# Patient Record
Sex: FEMALE | Race: WHITE | NOT HISPANIC OR LATINO | Employment: OTHER | URBAN - METROPOLITAN AREA
[De-identification: names, ages, dates, MRNs, and addresses within clinical notes are randomized per-mention and may not be internally consistent; named-entity substitution may affect disease eponyms.]

---

## 2019-11-07 PROBLEM — Z12.11 SCREEN FOR COLON CANCER: Status: ACTIVE | Noted: 2019-11-07

## 2020-10-13 PROBLEM — M85.89 OSTEOPENIA OF MULTIPLE SITES: Status: ACTIVE | Noted: 2020-10-13

## 2023-06-20 ENCOUNTER — OFFICE VISIT (OUTPATIENT)
Dept: PAIN MEDICINE | Facility: CLINIC | Age: 68
End: 2023-06-20
Payer: MEDICARE

## 2023-06-20 VITALS
HEIGHT: 62 IN | OXYGEN SATURATION: 97 % | WEIGHT: 147.19 LBS | HEART RATE: 68 BPM | BODY MASS INDEX: 27.08 KG/M2 | SYSTOLIC BLOOD PRESSURE: 142 MMHG | DIASTOLIC BLOOD PRESSURE: 65 MMHG

## 2023-06-20 DIAGNOSIS — M41.9 SCOLIOSIS OF THORACOLUMBAR SPINE, UNSPECIFIED SCOLIOSIS TYPE: ICD-10-CM

## 2023-06-20 DIAGNOSIS — M47.816 LUMBAR SPONDYLOSIS: ICD-10-CM

## 2023-06-20 DIAGNOSIS — M43.16 SPONDYLOLISTHESIS OF LUMBAR REGION: ICD-10-CM

## 2023-06-20 DIAGNOSIS — M54.16 LUMBAR RADICULOPATHY: Primary | ICD-10-CM

## 2023-06-20 DIAGNOSIS — M51.36 DDD (DEGENERATIVE DISC DISEASE), LUMBAR: ICD-10-CM

## 2023-06-20 PROCEDURE — 1101F PT FALLS ASSESS-DOCD LE1/YR: CPT | Mod: CPTII,S$GLB,, | Performed by: PHYSICIAN ASSISTANT

## 2023-06-20 PROCEDURE — 1160F RVW MEDS BY RX/DR IN RCRD: CPT | Mod: CPTII,S$GLB,, | Performed by: PHYSICIAN ASSISTANT

## 2023-06-20 PROCEDURE — 1125F PR PAIN SEVERITY QUANTIFIED, PAIN PRESENT: ICD-10-PCS | Mod: CPTII,S$GLB,, | Performed by: PHYSICIAN ASSISTANT

## 2023-06-20 PROCEDURE — 3078F DIAST BP <80 MM HG: CPT | Mod: CPTII,S$GLB,, | Performed by: PHYSICIAN ASSISTANT

## 2023-06-20 PROCEDURE — 3008F PR BODY MASS INDEX (BMI) DOCUMENTED: ICD-10-PCS | Mod: CPTII,S$GLB,, | Performed by: PHYSICIAN ASSISTANT

## 2023-06-20 PROCEDURE — 1159F MED LIST DOCD IN RCRD: CPT | Mod: CPTII,S$GLB,, | Performed by: PHYSICIAN ASSISTANT

## 2023-06-20 PROCEDURE — 3077F PR MOST RECENT SYSTOLIC BLOOD PRESSURE >= 140 MM HG: ICD-10-PCS | Mod: CPTII,S$GLB,, | Performed by: PHYSICIAN ASSISTANT

## 2023-06-20 PROCEDURE — 3288F PR FALLS RISK ASSESSMENT DOCUMENTED: ICD-10-PCS | Mod: CPTII,S$GLB,, | Performed by: PHYSICIAN ASSISTANT

## 2023-06-20 PROCEDURE — 1160F PR REVIEW ALL MEDS BY PRESCRIBER/CLIN PHARMACIST DOCUMENTED: ICD-10-PCS | Mod: CPTII,S$GLB,, | Performed by: PHYSICIAN ASSISTANT

## 2023-06-20 PROCEDURE — 99204 PR OFFICE/OUTPT VISIT, NEW, LEVL IV, 45-59 MIN: ICD-10-PCS | Mod: S$GLB,,, | Performed by: PHYSICIAN ASSISTANT

## 2023-06-20 PROCEDURE — 99999 PR PBB SHADOW E&M-EST. PATIENT-LVL IV: ICD-10-PCS | Mod: PBBFAC,,, | Performed by: PHYSICIAN ASSISTANT

## 2023-06-20 PROCEDURE — 99999 PR PBB SHADOW E&M-EST. PATIENT-LVL IV: CPT | Mod: PBBFAC,,, | Performed by: PHYSICIAN ASSISTANT

## 2023-06-20 PROCEDURE — 99204 OFFICE O/P NEW MOD 45 MIN: CPT | Mod: S$GLB,,, | Performed by: PHYSICIAN ASSISTANT

## 2023-06-20 PROCEDURE — 3288F FALL RISK ASSESSMENT DOCD: CPT | Mod: CPTII,S$GLB,, | Performed by: PHYSICIAN ASSISTANT

## 2023-06-20 PROCEDURE — 3077F SYST BP >= 140 MM HG: CPT | Mod: CPTII,S$GLB,, | Performed by: PHYSICIAN ASSISTANT

## 2023-06-20 PROCEDURE — 3078F PR MOST RECENT DIASTOLIC BLOOD PRESSURE < 80 MM HG: ICD-10-PCS | Mod: CPTII,S$GLB,, | Performed by: PHYSICIAN ASSISTANT

## 2023-06-20 PROCEDURE — 1125F AMNT PAIN NOTED PAIN PRSNT: CPT | Mod: CPTII,S$GLB,, | Performed by: PHYSICIAN ASSISTANT

## 2023-06-20 PROCEDURE — 1159F PR MEDICATION LIST DOCUMENTED IN MEDICAL RECORD: ICD-10-PCS | Mod: CPTII,S$GLB,, | Performed by: PHYSICIAN ASSISTANT

## 2023-06-20 PROCEDURE — 1101F PR PT FALLS ASSESS DOC 0-1 FALLS W/OUT INJ PAST YR: ICD-10-PCS | Mod: CPTII,S$GLB,, | Performed by: PHYSICIAN ASSISTANT

## 2023-06-20 PROCEDURE — 3008F BODY MASS INDEX DOCD: CPT | Mod: CPTII,S$GLB,, | Performed by: PHYSICIAN ASSISTANT

## 2023-06-20 RX ORDER — ALPRAZOLAM 0.5 MG/1
0.5 TABLET, ORALLY DISINTEGRATING ORAL ONCE AS NEEDED
Status: CANCELLED | OUTPATIENT
Start: 2023-06-20 | End: 2034-11-16

## 2023-06-20 RX ORDER — GABAPENTIN 300 MG/1
300 CAPSULE ORAL 2 TIMES DAILY
Qty: 60 CAPSULE | Refills: 2 | Status: SHIPPED | OUTPATIENT
Start: 2023-06-20 | End: 2023-08-25

## 2023-06-20 NOTE — H&P (VIEW-ONLY)
[facility] New Patient Evaluation      Referred by: Dr. Adriane Granados    PCP: Dr. Adriane Granados    CC: No chief complaint on file.     No flowsheet data found.      HPI:   Karla Bonilla is a 67 y.o. female patient who has a past medical history of Colon polyp and Personal history of nicotine dependence. She presents with low back and left leg pain.  She reports having a history of right-sided pain but this is currently not bothering her.  She describes pain in the left low back that radiates into the left buttock and posterior thigh down to her knee.  This has been present for the past 3 and half months.  She attributes this to an ankle fracture last year, being sedentary and then starting to walk again.  She describes the pain as throbbing, deep, sharp and shooting.  It is worse with sitting, standing, bending, touching, walking, nighttime, morning, extension, flexing, lifting, getting out of a bed or a chair.  The only relief she has found has been with gabapentin 300 mg nightly.  She saw her primary care physician yesterday and was instructed to increase to twice a day.  She has been doing physician directed home exercises for the past several months without any relief and her pain is currently too severe to go to physical therapy.  She denies having weakness or numbness, denies bladder or bowel incontinence.    Pain Intervention History:      Past Spine Surgical History:      Past and current medications:  Antineuropathics:  Gabapentin 300 mg twice daily  NSAIDs:  Took ibuprofen for several months but developed side effects and discontinued  Physical therapy:  Physician directed home exercise program for about 3 months.  Antidepressants:  Muscle relaxers:  Opioids:  Antiplatelets/Anticoagulants:    History:    Current Outpatient Medications:     ALPRAZolam (XANAX) 0.25 MG tablet, Take 1 tablet (0.25 mg total) by mouth 2 (two) times daily as needed for Anxiety., Disp: 30 tablet,  Rfl: 0    gabapentin (NEURONTIN) 300 MG capsule, Take 1 capsule (300 mg total) by mouth 2 (two) times daily., Disp: 60 capsule, Rfl: 2    Past Medical History:   Diagnosis Date    Colon polyp     Personal history of nicotine dependence        Past Surgical History:   Procedure Laterality Date    CATARACT EXTRACTION, BILATERAL      COLONOSCOPY N/A 2019    Procedure: COLONOSCOPY;  Surgeon: Pan Duran MD;  Location: [facility];  Service: Endoscopy;  Laterality: N/A;    TUBAL LIGATION         Family History   Problem Relation Age of Onset    Breast cancer Mother 70    Heart disease Father     Diabetes Brother        Social History     Socioeconomic History    Marital status:    Tobacco Use    Smoking status: Former     Packs/day: 1.00     Years: 44.00     Pack years: 44.00     Types: Cigarettes     Quit date: 2020     Years since quittin.9    Smokeless tobacco: Never   Substance and Sexual Activity    Alcohol use: Yes     Comment: occ glass of wine (1 a month)      Social Determinants of Health     Financial Resource Strain: Low Risk     Difficulty of Paying Living Expenses: Not hard at all   Food Insecurity: No Food Insecurity    Worried About Running Out of Food in the Last Year: Never true    Ran Out of Food in the Last Year: Never true   Transportation Needs: No Transportation Needs    Lack of Transportation (Medical): No    Lack of Transportation (Non-Medical): No   Physical Activity: Insufficiently Active    Days of Exercise per Week: 3 days    Minutes of Exercise per Session: 20 min   Stress: No Stress Concern Present    Feeling of Stress : Not at all   Social Connections: Unknown    Frequency of Communication with Friends and Family: More than three times a week    Frequency of Social Gatherings with Friends and Family: More than three times a week    Active Member of Clubs or Organizations: Yes    Attends Club or Organization Meetings: More than 4 times per year    Marital  "Status:    Housing Stability: Low Risk     Unable to Pay for Housing in the Last Year: No    Number of Places Lived in the Last Year: 1    Unstable Housing in the Last Year: No       Review of patient's allergies indicates:  No Known Allergies    Review of Systems:  The patient reports stomach pain, back pain, anxiety and loss of balance.  Balance of review of systems is negative.    Physical Exam:  Vitals:    06/20/23 0928   BP: (!) 142/65   Pulse: 68   SpO2: 97%   Weight: 66.7 kg (147 lb 2.5 oz)   Height: 5' 2" (1.575 m)   PainSc:   5   PainLoc: Abdomen     Body mass index is 26.92 kg/m².    Gen: NAD  Psych: mood appropriate for given condition  HEENT: eyes anicteric   CV: RRR  HEENT: anicteric   Respiratory: non-labored, no signs of respiratory distress  Abd: non-distended  Skin: warm, dry and intact.  Gait:  Cautious antalgic gait due to left leg pain.    Lumbar spine:  Lumbar spine: ROM is mildly reduced with flexion without increased pain.  Range of motion is severely reduced with extension with increased pain upon standing upright.  Severe pain with oblique extension to either side.  David's test is deferred due to pain.  Straight leg raise left leg pain.  Internal and external rotation of the hip is deferred due to pain.  Myofascial exam:  Exquisite tenderness to palpation to the left lower lumbar paraspinous muscles.  No tenderness to palpation over the bilateral greater trochanters and bilateral SI joint    Sensory:  Intact and symmetrical to light touch in L1-S1 dermatomes bilaterally.    Motor:     Right Left   L2/3 Iliacus Hip flexion  5  5   L3/4 Qudratus Femoris Knee Extension  5  5   L4/5 Tib Anterior Ankle Dorsiflexion   5  5   L5/S1 Extensor Hallicus Longus Great toe extension  5  5   S1/S2 Gastroc/Soleus Plantar Flexion  5  5      Right Left                  Patellar DTR 2+ 2+   Achilles DTR 0+ 0+                      Labs:  No results found for: LABA1C, HGBA1C    Lab Results   Component " Value Date    WBC 8.52 2022    HGB 13.4 2022    HCT 39.8 2022    MCV 95 2022     2022           Imagin2023 MRI lumbar spine   NOMENCLATURE: Five lumbar type vertebral bodies.     CORD/CAUDA EQUINA: Conus has normal size and signal and ends at a normal level of L1-L2.     ALIGNMENT: Trace retrolisthesis of L1 on L2.  4 mm retrolisthesis of L2 on L3.  Trace retrolisthesis of L3 on L4.  Trace grade 1 anterolisthesis of L4 on L5.     BONES: Vertebral body heights are maintained.  Type 1 endplate changes on the left at L1-2, L2-3 and to lesser extent at L3-4 and L4-5.  Several tiny Schmorl's nodes.  No aggressive bone marrow signal.     PARASPINAL AREA: Tiny possible synovial cyst in the left dorsal paraspinal soft tissues near the tip of the left L5-S1 facet (series 7, image 14).     LUMBAR DISC LEVELS:     T12-L1: No disc herniation or significant posterior osteophytic ridging.  No significant spinal canal or foraminal stenosis.     L1-L2: Trace retrolisthesis.  Mild disc bulge.  Disc height loss.  Mild bilateral facet hypertrophy.  Minimal narrowing of the bilateral lateral recesses.  No significant spinal canal stenosis.  Mild left foraminal stenosis.  No significant spinal canal or foraminal stenosis.     L2-L3: Retrolisthesis.  Uncovering the disc and mild disc bulge.  Mild bilateral facet hypertrophy.  Ligamentum flavum thickening.  Moderate left and mild right narrowing of the lateral recesses.  No significant spinal canal stenosis.  Moderate right and mild left foraminal stenosis.     L3-L4: Trace retrolisthesis.  Mild disc bulge.  Mild-moderate right and mild left facet hypertrophy.  Ligamentum flavum thickening.  Mild narrowing of the bilateral lateral recesses.  No significant spinal canal stenosis.  Mild bilateral foraminal stenosis.     L4-L5: Trace anterolisthesis.  Mild disc bulge.  Moderate right and mild-moderate left facet hypertrophy.  Ligamentum flavum  thickening.  Mild narrowing of the bilateral lateral recesses.  No significant spinal canal stenosis.  Mild bilateral foraminal stenosis.     L5-S1: Minimal disc bulge.  Mild-moderate left and mild right facet hypertrophy.  No significant spinal canal stenosis. Mild right greater than left foraminal stenosis.    Assessment:   Problem List Items Addressed This Visit    None  Visit Diagnoses       Lumbar radiculopathy    -  Primary    Relevant Orders    Case Request Operating Room: Injection-steroid-epidural-lumbar L3/4 (Completed)    Lumbar spondylosis        DDD (degenerative disc disease), lumbar        Spondylolisthesis of lumbar region        Scoliosis of thoracolumbar spine, unspecified scoliosis type                  Karla Bonilla is a 67 y.o. female patient who has a past medical history of Colon polyp and Personal history of nicotine dependence. She presents with low back and left leg pain.  She reports having a history of right-sided pain but this is currently not bothering her.  She describes pain in the left low back that radiates into the left buttock and posterior thigh down to her knee.  This has been present for the past 3 and half months.  She attributes this to an ankle fracture last year, being sedentary and then starting to walk again.  She describes the pain as throbbing, deep, sharp and shooting.  It is worse with sitting, standing, bending, touching, walking, nighttime, morning, extension, flexing, lifting, getting out of a bed or a chair.  The only relief she has found has been with gabapentin 300 mg nightly.  She saw her primary care physician yesterday and was instructed to increase to twice a day.  She has been doing physician directed home exercises for the past several months without any relief and her pain is currently too severe to go to physical therapy.  She denies having weakness or numbness, denies bladder or bowel incontinence.    1. I reviewed the patient's lumbar spine  MRI with her and we discussed that she has multilevel degenerative changes in the form of spondylosis, spondylolisthesis, scoliosis and disc degeneration.  I believe her symptoms are most likely due to L2/3 and L3/4 where she has left lateral recess stenosis.  2. She has tried 3 and half months of conservative treatment in the form of physician directed home exercises and NSAIDs.  She was prescribed gabapentin by primary care with some relief so she is increasing this to twice daily.  However, she continues to have severe pain and is unable to perform her usual activities.  3.  We discussed the role of interventional procedures and I will schedule her for an L3/4 interlaminar epidural steroid injection to the left to cover L2/3 and L3/4.  4. Follow-up in 2-3 weeks postprocedure or sooner as needed.    : Not applicable    This note was completed with dictation software and grammatical errors may exist.

## 2023-06-20 NOTE — PROGRESS NOTES
[facility] New Patient Evaluation      Referred by: Dr. Adriane Granados    PCP: Dr. Adriane Granados    CC: No chief complaint on file.     No flowsheet data found.      HPI:   Karla Bonilla is a 67 y.o. female patient who has a past medical history of Colon polyp and Personal history of nicotine dependence. She presents with low back and left leg pain.  She reports having a history of right-sided pain but this is currently not bothering her.  She describes pain in the left low back that radiates into the left buttock and posterior thigh down to her knee.  This has been present for the past 3 and half months.  She attributes this to an ankle fracture last year, being sedentary and then starting to walk again.  She describes the pain as throbbing, deep, sharp and shooting.  It is worse with sitting, standing, bending, touching, walking, nighttime, morning, extension, flexing, lifting, getting out of a bed or a chair.  The only relief she has found has been with gabapentin 300 mg nightly.  She saw her primary care physician yesterday and was instructed to increase to twice a day.  She has been doing physician directed home exercises for the past several months without any relief and her pain is currently too severe to go to physical therapy.  She denies having weakness or numbness, denies bladder or bowel incontinence.    Pain Intervention History:      Past Spine Surgical History:      Past and current medications:  Antineuropathics:  Gabapentin 300 mg twice daily  NSAIDs:  Took ibuprofen for several months but developed side effects and discontinued  Physical therapy:  Physician directed home exercise program for about 3 months.  Antidepressants:  Muscle relaxers:  Opioids:  Antiplatelets/Anticoagulants:    History:    Current Outpatient Medications:     ALPRAZolam (XANAX) 0.25 MG tablet, Take 1 tablet (0.25 mg total) by mouth 2 (two) times daily as needed for Anxiety., Disp: 30 tablet,  Rfl: 0    gabapentin (NEURONTIN) 300 MG capsule, Take 1 capsule (300 mg total) by mouth 2 (two) times daily., Disp: 60 capsule, Rfl: 2    Past Medical History:   Diagnosis Date    Colon polyp     Personal history of nicotine dependence        Past Surgical History:   Procedure Laterality Date    CATARACT EXTRACTION, BILATERAL      COLONOSCOPY N/A 2019    Procedure: COLONOSCOPY;  Surgeon: Pan Duran MD;  Location: [facility];  Service: Endoscopy;  Laterality: N/A;    TUBAL LIGATION         Family History   Problem Relation Age of Onset    Breast cancer Mother 70    Heart disease Father     Diabetes Brother        Social History     Socioeconomic History    Marital status:    Tobacco Use    Smoking status: Former     Packs/day: 1.00     Years: 44.00     Pack years: 44.00     Types: Cigarettes     Quit date: 2020     Years since quittin.9    Smokeless tobacco: Never   Substance and Sexual Activity    Alcohol use: Yes     Comment: occ glass of wine (1 a month)      Social Determinants of Health     Financial Resource Strain: Low Risk     Difficulty of Paying Living Expenses: Not hard at all   Food Insecurity: No Food Insecurity    Worried About Running Out of Food in the Last Year: Never true    Ran Out of Food in the Last Year: Never true   Transportation Needs: No Transportation Needs    Lack of Transportation (Medical): No    Lack of Transportation (Non-Medical): No   Physical Activity: Insufficiently Active    Days of Exercise per Week: 3 days    Minutes of Exercise per Session: 20 min   Stress: No Stress Concern Present    Feeling of Stress : Not at all   Social Connections: Unknown    Frequency of Communication with Friends and Family: More than three times a week    Frequency of Social Gatherings with Friends and Family: More than three times a week    Active Member of Clubs or Organizations: Yes    Attends Club or Organization Meetings: More than 4 times per year    Marital  "Status:    Housing Stability: Low Risk     Unable to Pay for Housing in the Last Year: No    Number of Places Lived in the Last Year: 1    Unstable Housing in the Last Year: No       Review of patient's allergies indicates:  No Known Allergies    Review of Systems:  The patient reports stomach pain, back pain, anxiety and loss of balance.  Balance of review of systems is negative.    Physical Exam:  Vitals:    06/20/23 0928   BP: (!) 142/65   Pulse: 68   SpO2: 97%   Weight: 66.7 kg (147 lb 2.5 oz)   Height: 5' 2" (1.575 m)   PainSc:   5   PainLoc: Abdomen     Body mass index is 26.92 kg/m².    Gen: NAD  Psych: mood appropriate for given condition  HEENT: eyes anicteric   CV: RRR  HEENT: anicteric   Respiratory: non-labored, no signs of respiratory distress  Abd: non-distended  Skin: warm, dry and intact.  Gait:  Cautious antalgic gait due to left leg pain.    Lumbar spine:  Lumbar spine: ROM is mildly reduced with flexion without increased pain.  Range of motion is severely reduced with extension with increased pain upon standing upright.  Severe pain with oblique extension to either side.  David's test is deferred due to pain.  Straight leg raise left leg pain.  Internal and external rotation of the hip is deferred due to pain.  Myofascial exam:  Exquisite tenderness to palpation to the left lower lumbar paraspinous muscles.  No tenderness to palpation over the bilateral greater trochanters and bilateral SI joint    Sensory:  Intact and symmetrical to light touch in L1-S1 dermatomes bilaterally.    Motor:     Right Left   L2/3 Iliacus Hip flexion  5  5   L3/4 Qudratus Femoris Knee Extension  5  5   L4/5 Tib Anterior Ankle Dorsiflexion   5  5   L5/S1 Extensor Hallicus Longus Great toe extension  5  5   S1/S2 Gastroc/Soleus Plantar Flexion  5  5      Right Left                  Patellar DTR 2+ 2+   Achilles DTR 0+ 0+                      Labs:  No results found for: LABA1C, HGBA1C    Lab Results   Component " Value Date    WBC 8.52 2022    HGB 13.4 2022    HCT 39.8 2022    MCV 95 2022     2022           Imagin2023 MRI lumbar spine   NOMENCLATURE: Five lumbar type vertebral bodies.     CORD/CAUDA EQUINA: Conus has normal size and signal and ends at a normal level of L1-L2.     ALIGNMENT: Trace retrolisthesis of L1 on L2.  4 mm retrolisthesis of L2 on L3.  Trace retrolisthesis of L3 on L4.  Trace grade 1 anterolisthesis of L4 on L5.     BONES: Vertebral body heights are maintained.  Type 1 endplate changes on the left at L1-2, L2-3 and to lesser extent at L3-4 and L4-5.  Several tiny Schmorl's nodes.  No aggressive bone marrow signal.     PARASPINAL AREA: Tiny possible synovial cyst in the left dorsal paraspinal soft tissues near the tip of the left L5-S1 facet (series 7, image 14).     LUMBAR DISC LEVELS:     T12-L1: No disc herniation or significant posterior osteophytic ridging.  No significant spinal canal or foraminal stenosis.     L1-L2: Trace retrolisthesis.  Mild disc bulge.  Disc height loss.  Mild bilateral facet hypertrophy.  Minimal narrowing of the bilateral lateral recesses.  No significant spinal canal stenosis.  Mild left foraminal stenosis.  No significant spinal canal or foraminal stenosis.     L2-L3: Retrolisthesis.  Uncovering the disc and mild disc bulge.  Mild bilateral facet hypertrophy.  Ligamentum flavum thickening.  Moderate left and mild right narrowing of the lateral recesses.  No significant spinal canal stenosis.  Moderate right and mild left foraminal stenosis.     L3-L4: Trace retrolisthesis.  Mild disc bulge.  Mild-moderate right and mild left facet hypertrophy.  Ligamentum flavum thickening.  Mild narrowing of the bilateral lateral recesses.  No significant spinal canal stenosis.  Mild bilateral foraminal stenosis.     L4-L5: Trace anterolisthesis.  Mild disc bulge.  Moderate right and mild-moderate left facet hypertrophy.  Ligamentum flavum  thickening.  Mild narrowing of the bilateral lateral recesses.  No significant spinal canal stenosis.  Mild bilateral foraminal stenosis.     L5-S1: Minimal disc bulge.  Mild-moderate left and mild right facet hypertrophy.  No significant spinal canal stenosis. Mild right greater than left foraminal stenosis.    Assessment:   Problem List Items Addressed This Visit    None  Visit Diagnoses       Lumbar radiculopathy    -  Primary    Relevant Orders    Case Request Operating Room: Injection-steroid-epidural-lumbar L3/4 (Completed)    Lumbar spondylosis        DDD (degenerative disc disease), lumbar        Spondylolisthesis of lumbar region        Scoliosis of thoracolumbar spine, unspecified scoliosis type                  Karla Bonilla is a 67 y.o. female patient who has a past medical history of Colon polyp and Personal history of nicotine dependence. She presents with low back and left leg pain.  She reports having a history of right-sided pain but this is currently not bothering her.  She describes pain in the left low back that radiates into the left buttock and posterior thigh down to her knee.  This has been present for the past 3 and half months.  She attributes this to an ankle fracture last year, being sedentary and then starting to walk again.  She describes the pain as throbbing, deep, sharp and shooting.  It is worse with sitting, standing, bending, touching, walking, nighttime, morning, extension, flexing, lifting, getting out of a bed or a chair.  The only relief she has found has been with gabapentin 300 mg nightly.  She saw her primary care physician yesterday and was instructed to increase to twice a day.  She has been doing physician directed home exercises for the past several months without any relief and her pain is currently too severe to go to physical therapy.  She denies having weakness or numbness, denies bladder or bowel incontinence.    1. I reviewed the patient's lumbar spine  MRI with her and we discussed that she has multilevel degenerative changes in the form of spondylosis, spondylolisthesis, scoliosis and disc degeneration.  I believe her symptoms are most likely due to L2/3 and L3/4 where she has left lateral recess stenosis.  2. She has tried 3 and half months of conservative treatment in the form of physician directed home exercises and NSAIDs.  She was prescribed gabapentin by primary care with some relief so she is increasing this to twice daily.  However, she continues to have severe pain and is unable to perform her usual activities.  3.  We discussed the role of interventional procedures and I will schedule her for an L3/4 interlaminar epidural steroid injection to the left to cover L2/3 and L3/4.  4. Follow-up in 2-3 weeks postprocedure or sooner as needed.    : Not applicable    This note was completed with dictation software and grammatical errors may exist.

## 2023-06-21 ENCOUNTER — PATIENT MESSAGE (OUTPATIENT)
Dept: PAIN MEDICINE | Facility: CLINIC | Age: 68
End: 2023-06-21
Payer: MEDICARE

## 2023-07-07 ENCOUNTER — HOSPITAL ENCOUNTER (OUTPATIENT)
Dept: RADIOLOGY | Facility: HOSPITAL | Age: 68
Discharge: HOME | End: 2023-07-07
Attending: ANESTHESIOLOGY | Admitting: ANESTHESIOLOGY
Payer: MEDICARE

## 2023-07-07 ENCOUNTER — HOSPITAL ENCOUNTER (OUTPATIENT)
Facility: HOSPITAL | Age: 68
Discharge: HOME | End: 2023-07-07
Attending: ANESTHESIOLOGY | Admitting: ANESTHESIOLOGY
Payer: MEDICARE

## 2023-07-07 VITALS
TEMPERATURE: 98 F | RESPIRATION RATE: 17 BRPM | HEIGHT: 62 IN | WEIGHT: 147 LBS | SYSTOLIC BLOOD PRESSURE: 113 MMHG | HEART RATE: 52 BPM | DIASTOLIC BLOOD PRESSURE: 77 MMHG | OXYGEN SATURATION: 98 % | BODY MASS INDEX: 27.05 KG/M2

## 2023-07-07 DIAGNOSIS — G89.29 CHRONIC MIDLINE LOW BACK PAIN WITH LEFT-SIDED SCIATICA: ICD-10-CM

## 2023-07-07 DIAGNOSIS — M54.42 CHRONIC MIDLINE LOW BACK PAIN WITH LEFT-SIDED SCIATICA: ICD-10-CM

## 2023-07-07 DIAGNOSIS — M54.50 LOWER BACK PAIN: ICD-10-CM

## 2023-07-07 DIAGNOSIS — M54.16 LUMBAR RADICULOPATHY: Primary | ICD-10-CM

## 2023-07-07 PROCEDURE — 62323 PR INJ LUMBAR/SACRAL, W/IMAGING GUIDANCE: ICD-10-PCS | Mod: ,,, | Performed by: ANESTHESIOLOGY

## 2023-07-07 PROCEDURE — 63600175 PHARM REV CODE 636 W HCPCS: Mod: PO | Performed by: ANESTHESIOLOGY

## 2023-07-07 PROCEDURE — 25500020 PHARM REV CODE 255: Mod: PO | Performed by: ANESTHESIOLOGY

## 2023-07-07 PROCEDURE — 25000003 PHARM REV CODE 250: Mod: PO | Performed by: PHYSICIAN ASSISTANT

## 2023-07-07 PROCEDURE — 25000003 PHARM REV CODE 250: Mod: PO | Performed by: ANESTHESIOLOGY

## 2023-07-07 PROCEDURE — 62323 NJX INTERLAMINAR LMBR/SAC: CPT | Mod: PO | Performed by: ANESTHESIOLOGY

## 2023-07-07 PROCEDURE — 62323 NJX INTERLAMINAR LMBR/SAC: CPT | Mod: ,,, | Performed by: ANESTHESIOLOGY

## 2023-07-07 PROCEDURE — 76000 FLUOROSCOPY <1 HR PHYS/QHP: CPT | Mod: TC,PO

## 2023-07-07 RX ORDER — METHYLPREDNISOLONE ACETATE 80 MG/ML
INJECTION, SUSPENSION INTRA-ARTICULAR; INTRALESIONAL; INTRAMUSCULAR; SOFT TISSUE
Status: DISCONTINUED | OUTPATIENT
Start: 2023-07-07 | End: 2023-07-07 | Stop reason: HOSPADM

## 2023-07-07 RX ORDER — ALPRAZOLAM 0.5 MG/1
0.5 TABLET, ORALLY DISINTEGRATING ORAL ONCE AS NEEDED
Status: COMPLETED | OUTPATIENT
Start: 2023-07-07 | End: 2023-07-07

## 2023-07-07 RX ORDER — LIDOCAINE HYDROCHLORIDE 10 MG/ML
INJECTION, SOLUTION EPIDURAL; INFILTRATION; INTRACAUDAL; PERINEURAL
Status: DISCONTINUED | OUTPATIENT
Start: 2023-07-07 | End: 2023-07-07 | Stop reason: HOSPADM

## 2023-07-07 RX ORDER — SODIUM CHLORIDE 0.9 G/100ML
IRRIGANT IRRIGATION
Status: DISCONTINUED | OUTPATIENT
Start: 2023-07-07 | End: 2023-07-07 | Stop reason: HOSPADM

## 2023-07-07 RX ADMIN — ALPRAZOLAM 0.5 MG: 0.5 TABLET, ORALLY DISINTEGRATING ORAL at 09:07

## 2023-07-07 NOTE — OP NOTE
"Procedure Note    Procedure Date: 7/7/2023    Procedure Performed:  L3/4 lumbar interlaminar epidural steroid injection under fluoroscopy.    Indications:  Karla Bonilla presents with lumbar radiculitis/radiculopathy secondary to disc herniation, osteophyte/osteophyte complexes, and/or severe degenerative disc disease producing foraminal or central spinal stenosis.  The pain has been present for at least 4 weeks and the patient has failed to respond to noninvasive conservative care.  Pain rated by NRS at baseline prior to intervention is 6/10.  Their radiculitis/radiculopathy and/or neurogenic claudication is severe enough to greatly impact their quality of life or function.     Pre-op diagnosis: Lumbar Radiculopathy    Post-op diagnosis: same    Physician: Brian Traylor MD    IV anxiolysis medications: none    Medications injected: depomedrol 80mg, 1% Lidocaine 1ml, 2 mL sterile, preservative-free normal saline.    Local anesthetic used: 1% Lidocaine, 1 ml    Estimated Blood Loss: none    Complications:  none    Technique:  The patient was interviewed in the holding area and Risks/Benefits were discussed, including, but not limited to, the possibility of new or different pain, bleeding or infection.   All questions were answered.  The patient understood and accepted risks.  Consent was verfied.  A time-out was taken to identify patient and procedure prior to starting the procedure. The patient was placed in the prone position on the fluoroscopy table. The area of the lumbar spine was prepped with Chloraprep x2 and draped in a sterile manner. The L3/4 interspace was identified and marked under AP fluoroscopy. The skin and subcutaneous tissues overlying the targeted interspace were anesthetized with 3-5 mL of 1% lidocaine using a 25G, 1.5" needle.  A 20G, 3.5" Tuohy epidural needle was directed toward the interspace under fluoroscopic guidance until the ligamentum flavum was engaged. From this point, a loss " of resistance technique with a glass syringe and saline was used to identify entrance of the needle into the epidural space. Once loss of resistance was observed 1 mL of contrast solution was injected. An appropriate epidurogram was noted.  A 4 mL mixture consisting of saline, 1 mL 1% Lidocaine and 80 mg of depomedrol was injected slowly and without resistance.  The needle was flushed with normal saline and removed. The contrast was seen to be displaced after injection. Patient was awake/responsive during all injections.  The patient tolerated the procedure well and was transferred to the [facility] in stable condition.  The patient was monitored after the procedure and was given post-procedure and discharge instructions to follow at home. The patient was discharged in a stable condition.

## 2023-07-07 NOTE — DISCHARGE SUMMARY
[facility]  Discharge Note  Short Stay    Admit Date: 7/7/2023    Discharge Date: 7/7/2023    Attending Physician: Brian Traylor     Discharge Provider: Brian Traylor    Diagnoses:  There are no hospital problems to display for this patient.      Discharged Condition: Good    Final Diagnoses: Lumbar radiculopathy [M54.16]    Disposition: Home or Self Care    Hospital Course: No complications, uneventful    Outcome of Hospitalization, Treatment, Procedure, or Surgery:  Patient was admitted for outpatient interventional pain management procedure. The patient tolerated the procedure well with no complications.    Follow up/Patient Instructions:  Follow up as scheduled in Pain Management office in 2-3 weeks.  Patient has received instructions and follow up date and time.    Medications:  Continue previous medications    Discharge Procedure Orders   Notify your health care provider if you experience any of the following:  temperature >100.4     Notify your health care provider if you experience any of the following:  persistent nausea and vomiting or diarrhea     Notify your health care provider if you experience any of the following:  severe uncontrolled pain     Notify your health care provider if you experience any of the following:  redness, tenderness, or signs of infection (pain, swelling, redness, odor or green/yellow discharge around incision site)     Notify your health care provider if you experience any of the following:  difficulty breathing or increased cough     Notify your health care provider if you experience any of the following:  severe persistent headache     Notify your health care provider if you experience any of the following:  worsening rash     Notify your health care provider if you experience any of the following:  persistent dizziness, light-headedness, or visual disturbances     Notify your health care provider if you experience any of the following:  increased confusion or  weakness     Activity as tolerated

## 2023-08-01 ENCOUNTER — OFFICE VISIT (OUTPATIENT)
Dept: PAIN MEDICINE | Facility: CLINIC | Age: 68
End: 2023-08-01
Payer: MEDICARE

## 2023-08-01 VITALS
SYSTOLIC BLOOD PRESSURE: 130 MMHG | WEIGHT: 148.81 LBS | HEIGHT: 62 IN | DIASTOLIC BLOOD PRESSURE: 63 MMHG | HEART RATE: 58 BPM | BODY MASS INDEX: 27.38 KG/M2

## 2023-08-01 DIAGNOSIS — M54.16 LUMBAR RADICULOPATHY: Primary | ICD-10-CM

## 2023-08-01 DIAGNOSIS — M54.9 DORSALGIA: ICD-10-CM

## 2023-08-01 PROCEDURE — 1160F RVW MEDS BY RX/DR IN RCRD: CPT | Mod: CPTII,S$GLB,, | Performed by: ANESTHESIOLOGY

## 2023-08-01 PROCEDURE — 1101F PT FALLS ASSESS-DOCD LE1/YR: CPT | Mod: CPTII,S$GLB,, | Performed by: ANESTHESIOLOGY

## 2023-08-01 PROCEDURE — 99213 PR OFFICE/OUTPT VISIT, EST, LEVL III, 20-29 MIN: ICD-10-PCS | Mod: S$GLB,,, | Performed by: ANESTHESIOLOGY

## 2023-08-01 PROCEDURE — 99999 PR PBB SHADOW E&M-EST. PATIENT-LVL III: CPT | Mod: PBBFAC,,, | Performed by: ANESTHESIOLOGY

## 2023-08-01 PROCEDURE — 1159F MED LIST DOCD IN RCRD: CPT | Mod: CPTII,S$GLB,, | Performed by: ANESTHESIOLOGY

## 2023-08-01 PROCEDURE — 1125F AMNT PAIN NOTED PAIN PRSNT: CPT | Mod: CPTII,S$GLB,, | Performed by: ANESTHESIOLOGY

## 2023-08-01 PROCEDURE — 99213 OFFICE O/P EST LOW 20 MIN: CPT | Mod: S$GLB,,, | Performed by: ANESTHESIOLOGY

## 2023-08-01 PROCEDURE — 1101F PR PT FALLS ASSESS DOC 0-1 FALLS W/OUT INJ PAST YR: ICD-10-PCS | Mod: CPTII,S$GLB,, | Performed by: ANESTHESIOLOGY

## 2023-08-01 PROCEDURE — 3008F BODY MASS INDEX DOCD: CPT | Mod: CPTII,S$GLB,, | Performed by: ANESTHESIOLOGY

## 2023-08-01 PROCEDURE — 1125F PR PAIN SEVERITY QUANTIFIED, PAIN PRESENT: ICD-10-PCS | Mod: CPTII,S$GLB,, | Performed by: ANESTHESIOLOGY

## 2023-08-01 PROCEDURE — 99999 PR PBB SHADOW E&M-EST. PATIENT-LVL III: ICD-10-PCS | Mod: PBBFAC,,, | Performed by: ANESTHESIOLOGY

## 2023-08-01 PROCEDURE — 3075F SYST BP GE 130 - 139MM HG: CPT | Mod: CPTII,S$GLB,, | Performed by: ANESTHESIOLOGY

## 2023-08-01 PROCEDURE — 1160F PR REVIEW ALL MEDS BY PRESCRIBER/CLIN PHARMACIST DOCUMENTED: ICD-10-PCS | Mod: CPTII,S$GLB,, | Performed by: ANESTHESIOLOGY

## 2023-08-01 PROCEDURE — 3008F PR BODY MASS INDEX (BMI) DOCUMENTED: ICD-10-PCS | Mod: CPTII,S$GLB,, | Performed by: ANESTHESIOLOGY

## 2023-08-01 PROCEDURE — 1159F PR MEDICATION LIST DOCUMENTED IN MEDICAL RECORD: ICD-10-PCS | Mod: CPTII,S$GLB,, | Performed by: ANESTHESIOLOGY

## 2023-08-01 PROCEDURE — 3078F DIAST BP <80 MM HG: CPT | Mod: CPTII,S$GLB,, | Performed by: ANESTHESIOLOGY

## 2023-08-01 PROCEDURE — 3288F PR FALLS RISK ASSESSMENT DOCUMENTED: ICD-10-PCS | Mod: CPTII,S$GLB,, | Performed by: ANESTHESIOLOGY

## 2023-08-01 PROCEDURE — 3075F PR MOST RECENT SYSTOLIC BLOOD PRESS GE 130-139MM HG: ICD-10-PCS | Mod: CPTII,S$GLB,, | Performed by: ANESTHESIOLOGY

## 2023-08-01 PROCEDURE — 3288F FALL RISK ASSESSMENT DOCD: CPT | Mod: CPTII,S$GLB,, | Performed by: ANESTHESIOLOGY

## 2023-08-01 PROCEDURE — 3078F PR MOST RECENT DIASTOLIC BLOOD PRESSURE < 80 MM HG: ICD-10-PCS | Mod: CPTII,S$GLB,, | Performed by: ANESTHESIOLOGY

## 2023-08-01 NOTE — PROGRESS NOTES
[facility] Follow Up Evaluation      Referred by: No ref. provider found    PCP: Dr. Adriane Granados    CC:   Chief Complaint   Patient presents with    ciatic pain    Hip Pain      No flowsheet data found.      Interval HPI 8/1/23:  Ms. Hector returns the office for follow-up.   She is status post L3-4 WONG on 07/07/2023 between 80-85% relief of her pain.  She reports she can still have some stiffness in the morning however she does some stretching and exercises in the gradually improves.  Overall she feels like significant improvement in her previous left-sided pain.  She reports improvement in her mobility and quality of life.    HPI:   Karla Bonilla is a 67 y.o. female patient who has a past medical history of Colon polyp and Personal history of nicotine dependence. She presents with low back and left leg pain.  She reports having a history of right-sided pain but this is currently not bothering her.  She describes pain in the left low back that radiates into the left buttock and posterior thigh down to her knee.  This has been present for the past 3 and half months.  She attributes this to an ankle fracture last year, being sedentary and then starting to walk again.  She describes the pain as throbbing, deep, sharp and shooting.  It is worse with sitting, standing, bending, touching, walking, nighttime, morning, extension, flexing, lifting, getting out of a bed or a chair.  The only relief she has found has been with gabapentin 300 mg nightly.  She saw her primary care physician yesterday and was instructed to increase to twice a day.  She has been doing physician directed home exercises for the past several months without any relief and her pain is currently too severe to go to physical therapy.  She denies having weakness or numbness, denies bladder or bowel incontinence.    Pain Intervention History:  - s/p L3-4 WONG on 7/7/23    Past Spine Surgical History:      Past and current  medications:  Antineuropathics:  Gabapentin 300 mg twice daily  NSAIDs:  Took ibuprofen for several months but developed side effects and discontinued  Physical therapy:  Physician directed home exercise program for about 3 months.  Antidepressants:  Muscle relaxers:  Opioids:  Antiplatelets/Anticoagulants:    History:    Current Outpatient Medications:     ALPRAZolam (XANAX) 0.25 MG tablet, Take 1 tablet (0.25 mg total) by mouth 2 (two) times daily as needed for Anxiety., Disp: 30 tablet, Rfl: 0    gabapentin (NEURONTIN) 300 MG capsule, Take 1 capsule (300 mg total) by mouth 2 (two) times daily., Disp: 60 capsule, Rfl: 2    Past Medical History:   Diagnosis Date    Colon polyp 2019    Personal history of nicotine dependence        Past Surgical History:   Procedure Laterality Date    CATARACT EXTRACTION, BILATERAL      COLONOSCOPY N/A 11/7/2019    Procedure: COLONOSCOPY;  Surgeon: Pan Duran MD;  Location: [facility];  Service: Endoscopy;  Laterality: N/A;    EPIDURAL STEROID INJECTION INTO LUMBAR SPINE Left 7/7/2023    Procedure: Injection-steroid-epidural-lumbar L3/4;  Surgeon: Brian Traylor MD;  Location: [facility];  Service: Pain Management;  Laterality: Left;    TUBAL LIGATION         Family History   Problem Relation Age of Onset    Breast cancer Mother 70    Heart disease Father     Diabetes Brother        Social History     Socioeconomic History    Marital status:    Tobacco Use    Smoking status: Former     Current packs/day: 0.00     Average packs/day: 1 pack/day for 44.0 years (44.0 ttl pk-yrs)     Types: Cigarettes     Start date: 7/21/1976     Quit date: 7/21/2020     Years since quitting: 3.0    Smokeless tobacco: Never   Substance and Sexual Activity    Alcohol use: Yes     Comment: occ glass of wine (1 a month)      Social Determinants of Health     Financial Resource Strain: Low Risk  (6/18/2023)    Overall Financial Resource Strain (CARDIA)     Difficulty of Paying Living Expenses:  "Not hard at all   Food Insecurity: No Food Insecurity (6/18/2023)    Hunger Vital Sign     Worried About Running Out of Food in the Last Year: Never true     Ran Out of Food in the Last Year: Never true   Transportation Needs: No Transportation Needs (6/18/2023)    PRAPARE - Transportation     Lack of Transportation (Medical): No     Lack of Transportation (Non-Medical): No   Physical Activity: Insufficiently Active (6/18/2023)    Exercise Vital Sign     Days of Exercise per Week: 3 days     Minutes of Exercise per Session: 20 min   Stress: No Stress Concern Present (6/18/2023)    [employer] [place] - Occupational Stress Questionnaire     Feeling of Stress : Not at all   Social Connections: Unknown (6/18/2023)    Social Connection and Isolation Panel [NHANES]     Frequency of Communication with Friends and Family: More than three times a week     Frequency of Social Gatherings with Friends and Family: More than three times a week     Active Member of Clubs or Organizations: Yes     Attends Club or Organization Meetings: More than 4 times per year     Marital Status:    Housing Stability: Low Risk  (6/18/2023)    Housing Stability Vital Sign     Unable to Pay for Housing in the Last Year: No     Number of Places Lived in the Last Year: 1     Unstable Housing in the Last Year: No       Review of patient's allergies indicates:  No Known Allergies    Review of Systems:  The patient reports stomach pain, back pain, anxiety and loss of balance.  Balance of review of systems is negative.    Physical Exam:  Vitals:    08/01/23 0926   BP: 130/63   Pulse: (!) 58   Weight: 67.5 kg (148 lb 13 oz)   Height: 5' 2" (1.575 m)   PainSc:   1   PainLoc: Hip       Body mass index is 27.22 kg/m².    Gen: NAD  Psych: mood appropriate for given condition  HEENT: eyes anicteric   CV: RRR  HEENT: anicteric   Respiratory: non-labored, no signs of respiratory distress  Abd: non-distended  Skin: warm, dry and " "intact.  Gait:  Cautious antalgic gait due to left leg pain.    Lumbar spine:  Lumbar spine: ROM is mildly reduced with flexion without increased pain.  Range of motion is severely reduced with extension with increased pain upon standing upright.  Severe pain with oblique extension to either side.  David's test is deferred due to pain.  Straight leg raise left leg pain.  Internal and external rotation of the hip is deferred due to pain.  Myofascial exam:  Exquisite tenderness to palpation to the left lower lumbar paraspinous muscles.  No tenderness to palpation over the bilateral greater trochanters and bilateral SI joint    Sensory:  Intact and symmetrical to light touch in L1-S1 dermatomes bilaterally.    Motor:     Right Left   L2/3 Iliacus Hip flexion  5  5   L3/4 Qudratus Femoris Knee Extension  5  5   L4/5 Tib Anterior Ankle Dorsiflexion   5  5   L5/S1 Extensor Hallicus Longus Great toe extension  5  5   S1/S2 Gastroc/Soleus Plantar Flexion  5  5      Right Left                  Patellar DTR 2+ 2+   Achilles DTR 0+ 0+                      Labs:  No results found for: "LABA1C", "HGBA1C"    Lab Results   Component Value Date    WBC 8.52 2022    HGB 13.4 2022    HCT 39.8 2022    MCV 95 2022     2022       Imagin2023 MRI lumbar spine   NOMENCLATURE: Five lumbar type vertebral bodies.     CORD/CAUDA EQUINA: Conus has normal size and signal and ends at a normal level of L1-L2.     ALIGNMENT: Trace retrolisthesis of L1 on L2.  4 mm retrolisthesis of L2 on L3.  Trace retrolisthesis of L3 on L4.  Trace grade 1 anterolisthesis of L4 on L5.     BONES: Vertebral body heights are maintained.  Type 1 endplate changes on the left at L1-2, L2-3 and to lesser extent at L3-4 and L4-5.  Several tiny Schmorl's nodes.  No aggressive bone marrow signal.     PARASPINAL AREA: Tiny possible synovial cyst in the left dorsal paraspinal soft tissues near the tip of the left L5-S1 facet " (series 7, image 14).     LUMBAR DISC LEVELS:     T12-L1: No disc herniation or significant posterior osteophytic ridging.  No significant spinal canal or foraminal stenosis.  L1-L2: Trace retrolisthesis.  Mild disc bulge.  Disc height loss.  Mild bilateral facet hypertrophy.  Minimal narrowing of the bilateral lateral recesses.  No significant spinal canal stenosis.  Mild left foraminal stenosis.  No significant spinal canal or foraminal stenosis.  L2-L3: Retrolisthesis.  Uncovering the disc and mild disc bulge.  Mild bilateral facet hypertrophy.  Ligamentum flavum thickening.  Moderate left and mild right narrowing of the lateral recesses.  No significant spinal canal stenosis.  Moderate right and mild left foraminal stenosis.  L3-L4: Trace retrolisthesis.  Mild disc bulge.  Mild-moderate right and mild left facet hypertrophy.  Ligamentum flavum thickening.  Mild narrowing of the bilateral lateral recesses.  No significant spinal canal stenosis.  Mild bilateral foraminal stenosis.  L4-L5: Trace anterolisthesis.  Mild disc bulge.  Moderate right and mild-moderate left facet hypertrophy.  Ligamentum flavum thickening.  Mild narrowing of the bilateral lateral recesses.  No significant spinal canal stenosis.  Mild bilateral foraminal stenosis.  L5-S1: Minimal disc bulge.  Mild-moderate left and mild right facet hypertrophy.  No significant spinal canal stenosis. Mild right greater than left foraminal stenosis.    Assessment:   Problem List Items Addressed This Visit    None  Visit Diagnoses       Lumbar radiculopathy    -  Primary    Dorsalgia                    Karla Bonilla is a 67 y.o. female patient who has a past medical history of Colon polyp and Personal history of nicotine dependence. She presents with low back and left leg pain.  She reports having a history of right-sided pain but this is currently not bothering her.  She describes pain in the left low back that radiates into the left buttock and  posterior thigh down to her knee.  This has been present for the past 3 and half months.  She attributes this to an ankle fracture last year, being sedentary and then starting to walk again.  She describes the pain as throbbing, deep, sharp and shooting.  It is worse with sitting, standing, bending, touching, walking, nighttime, morning, extension, flexing, lifting, getting out of a bed or a chair.  The only relief she has found has been with gabapentin 300 mg nightly.  She saw her primary care physician yesterday and was instructed to increase to twice a day.  She has been doing physician directed home exercises for the past several months without any relief and her pain is currently too severe to go to physical therapy.  She denies having weakness or numbness, denies bladder or bowel incontinence.    8/1/23 - Ms. Hector returns the office for follow-up.   She is status post L3-4 WONG on 07/07/2023 between 80-85% relief of her pain.  She reports she can still have some stiffness in the morning however she does some stretching and exercises in the gradually improves.  Overall she feels like significant improvement in her previous left-sided pain.  She reports improvement in her mobility and quality of life.  At this time she is going to follow up with me on an as needed basis if she has any return or worsening pain      : Not applicable    This note was completed with dictation software and grammatical errors may exist.

## 2023-08-25 RX ORDER — GABAPENTIN 300 MG/1
300 CAPSULE ORAL 2 TIMES DAILY
Qty: 60 CAPSULE | Refills: 2 | Status: SHIPPED | OUTPATIENT
Start: 2023-08-25 | End: 2023-10-11

## 2023-09-25 ENCOUNTER — OFFICE VISIT (OUTPATIENT)
Dept: PAIN MEDICINE | Facility: CLINIC | Age: 68
End: 2023-09-25
Payer: MEDICARE

## 2023-09-25 VITALS
SYSTOLIC BLOOD PRESSURE: 120 MMHG | RESPIRATION RATE: 18 BRPM | WEIGHT: 147.19 LBS | HEIGHT: 62 IN | DIASTOLIC BLOOD PRESSURE: 60 MMHG | HEART RATE: 59 BPM | BODY MASS INDEX: 27.08 KG/M2

## 2023-09-25 DIAGNOSIS — M54.9 DORSALGIA: ICD-10-CM

## 2023-09-25 DIAGNOSIS — M54.16 LUMBAR RADICULOPATHY: Primary | ICD-10-CM

## 2023-09-25 PROCEDURE — 1160F RVW MEDS BY RX/DR IN RCRD: CPT | Mod: CPTII,S$GLB,, | Performed by: ANESTHESIOLOGY

## 2023-09-25 PROCEDURE — 3074F SYST BP LT 130 MM HG: CPT | Mod: CPTII,S$GLB,, | Performed by: ANESTHESIOLOGY

## 2023-09-25 PROCEDURE — 1159F MED LIST DOCD IN RCRD: CPT | Mod: CPTII,S$GLB,, | Performed by: ANESTHESIOLOGY

## 2023-09-25 PROCEDURE — 3008F PR BODY MASS INDEX (BMI) DOCUMENTED: ICD-10-PCS | Mod: CPTII,S$GLB,, | Performed by: ANESTHESIOLOGY

## 2023-09-25 PROCEDURE — 1159F PR MEDICATION LIST DOCUMENTED IN MEDICAL RECORD: ICD-10-PCS | Mod: CPTII,S$GLB,, | Performed by: ANESTHESIOLOGY

## 2023-09-25 PROCEDURE — 3008F BODY MASS INDEX DOCD: CPT | Mod: CPTII,S$GLB,, | Performed by: ANESTHESIOLOGY

## 2023-09-25 PROCEDURE — 99999 PR PBB SHADOW E&M-EST. PATIENT-LVL IV: ICD-10-PCS | Mod: PBBFAC,,, | Performed by: ANESTHESIOLOGY

## 2023-09-25 PROCEDURE — 3288F FALL RISK ASSESSMENT DOCD: CPT | Mod: CPTII,S$GLB,, | Performed by: ANESTHESIOLOGY

## 2023-09-25 PROCEDURE — 99999 PR PBB SHADOW E&M-EST. PATIENT-LVL IV: CPT | Mod: PBBFAC,,, | Performed by: ANESTHESIOLOGY

## 2023-09-25 PROCEDURE — 1101F PT FALLS ASSESS-DOCD LE1/YR: CPT | Mod: CPTII,S$GLB,, | Performed by: ANESTHESIOLOGY

## 2023-09-25 PROCEDURE — 1160F PR REVIEW ALL MEDS BY PRESCRIBER/CLIN PHARMACIST DOCUMENTED: ICD-10-PCS | Mod: CPTII,S$GLB,, | Performed by: ANESTHESIOLOGY

## 2023-09-25 PROCEDURE — 3288F PR FALLS RISK ASSESSMENT DOCUMENTED: ICD-10-PCS | Mod: CPTII,S$GLB,, | Performed by: ANESTHESIOLOGY

## 2023-09-25 PROCEDURE — 99214 PR OFFICE/OUTPT VISIT, EST, LEVL IV, 30-39 MIN: ICD-10-PCS | Mod: S$GLB,,, | Performed by: ANESTHESIOLOGY

## 2023-09-25 PROCEDURE — 99214 OFFICE O/P EST MOD 30 MIN: CPT | Mod: S$GLB,,, | Performed by: ANESTHESIOLOGY

## 2023-09-25 PROCEDURE — 3074F PR MOST RECENT SYSTOLIC BLOOD PRESSURE < 130 MM HG: ICD-10-PCS | Mod: CPTII,S$GLB,, | Performed by: ANESTHESIOLOGY

## 2023-09-25 PROCEDURE — 1125F PR PAIN SEVERITY QUANTIFIED, PAIN PRESENT: ICD-10-PCS | Mod: CPTII,S$GLB,, | Performed by: ANESTHESIOLOGY

## 2023-09-25 PROCEDURE — 1125F AMNT PAIN NOTED PAIN PRSNT: CPT | Mod: CPTII,S$GLB,, | Performed by: ANESTHESIOLOGY

## 2023-09-25 PROCEDURE — 3078F PR MOST RECENT DIASTOLIC BLOOD PRESSURE < 80 MM HG: ICD-10-PCS | Mod: CPTII,S$GLB,, | Performed by: ANESTHESIOLOGY

## 2023-09-25 PROCEDURE — 1101F PR PT FALLS ASSESS DOC 0-1 FALLS W/OUT INJ PAST YR: ICD-10-PCS | Mod: CPTII,S$GLB,, | Performed by: ANESTHESIOLOGY

## 2023-09-25 PROCEDURE — 3078F DIAST BP <80 MM HG: CPT | Mod: CPTII,S$GLB,, | Performed by: ANESTHESIOLOGY

## 2023-09-25 RX ORDER — ALPRAZOLAM 0.25 MG/1
1 TABLET ORAL ONCE
Status: CANCELLED | OUTPATIENT
Start: 2023-09-25 | End: 2023-09-25

## 2023-09-25 NOTE — PROGRESS NOTES
[facility] Follow Up Evaluation      Referred by: No ref. provider found    PCP: Dr. Adriane Granados    CC:   Chief Complaint   Patient presents with    Back Pain          9/25/2023     1:19 PM   Last 3 PDI Scores   Pain Disability Index (PDI) 40         Interval HPI 9/25/23:  Ms. Hector returns the office for follow-up.  Her left-sided pain has been doing very well however she reports a couple weeks ago she was doing some house chores and developed back pain with pain radiating down right leg.  She denies any new numbness or weakness.  Pain is constant, sharp, shooting.    HPI:   Karla Bonilla is a 68 y.o. female patient who has a past medical history of Colon polyp and Personal history of nicotine dependence. She presents with low back and left leg pain.  She reports having a history of right-sided pain but this is currently not bothering her.  She describes pain in the left low back that radiates into the left buttock and posterior thigh down to her knee.  This has been present for the past 3 and half months.  She attributes this to an ankle fracture last year, being sedentary and then starting to walk again.  She describes the pain as throbbing, deep, sharp and shooting.  It is worse with sitting, standing, bending, touching, walking, nighttime, morning, extension, flexing, lifting, getting out of a bed or a chair.  The only relief she has found has been with gabapentin 300 mg nightly.  She saw her primary care physician yesterday and was instructed to increase to twice a day.  She has been doing physician directed home exercises for the past several months without any relief and her pain is currently too severe to go to physical therapy.  She denies having weakness or numbness, denies bladder or bowel incontinence.    Pain Intervention History:  - s/p L3-4 WONG on 7/7/23 with 80-85% relief    Past Spine Surgical History:      Past and current medications:  Antineuropathics:  Gabapentin  300 mg twice daily  NSAIDs:  Took ibuprofen for several months but developed side effects and discontinued  Physical therapy:  Physician directed home exercise program for about 3 months.  Antidepressants:  Muscle relaxers:  Opioids:  Antiplatelets/Anticoagulants:    History:    Current Outpatient Medications:     gabapentin (NEURONTIN) 300 MG capsule, TAKE 1 CAPSULE(300 MG) BY MOUTH TWICE DAILY, Disp: 60 capsule, Rfl: 2    methylPREDNISolone (MEDROL DOSEPACK) 4 mg tablet, use as directed, Disp: 21 each, Rfl: 0    tiZANidine (ZANAFLEX) 2 MG tablet, Take 1 tablet (2 mg total) by mouth every 8 (eight) hours as needed (muscle spasm)., Disp: 30 tablet, Rfl: 0    traMADoL (ULTRAM) 50 mg tablet, Take 1 tablet (50 mg total) by mouth every 8 (eight) hours as needed for Pain., Disp: 21 tablet, Rfl: 0    ALPRAZolam (XANAX) 0.25 MG tablet, Take 1 tablet (0.25 mg total) by mouth 2 (two) times daily as needed for Anxiety., Disp: 30 tablet, Rfl: 0    Past Medical History:   Diagnosis Date    Colon polyp 2019    Personal history of nicotine dependence        Past Surgical History:   Procedure Laterality Date    CATARACT EXTRACTION, BILATERAL      COLONOSCOPY N/A 11/7/2019    Procedure: COLONOSCOPY;  Surgeon: Pan Duran MD;  Location: [facility];  Service: Endoscopy;  Laterality: N/A;    EPIDURAL STEROID INJECTION INTO LUMBAR SPINE Left 7/7/2023    Procedure: Injection-steroid-epidural-lumbar L3/4;  Surgeon: Brian Traylor MD;  Location: [facility] OR;  Service: Pain Management;  Laterality: Left;    TUBAL LIGATION         Family History   Problem Relation Age of Onset    Breast cancer Mother 70    Heart disease Father     Diabetes Brother        Social History     Socioeconomic History    Marital status:    Tobacco Use    Smoking status: Former     Current packs/day: 0.00     Average packs/day: 1 pack/day for 44.0 years (44.0 ttl pk-yrs)     Types: Cigarettes     Start date: 7/21/1976     Quit date: 7/21/2020     Years  since quitting: 3.1    Smokeless tobacco: Never   Substance and Sexual Activity    Alcohol use: Yes     Comment: occ glass of wine (1 a month)      Social Determinants of Health     Financial Resource Strain: Low Risk  (9/18/2023)    Overall Financial Resource Strain (CARDIA)     Difficulty of Paying Living Expenses: Not hard at all   Food Insecurity: No Food Insecurity (9/18/2023)    Hunger Vital Sign     Worried About Running Out of Food in the Last Year: Never true     Ran Out of Food in the Last Year: Never true   Transportation Needs: No Transportation Needs (9/18/2023)    PRAPARE - Transportation     Lack of Transportation (Medical): No     Lack of Transportation (Non-Medical): No   Physical Activity: Insufficiently Active (9/18/2023)    Exercise Vital Sign     Days of Exercise per Week: 3 days     Minutes of Exercise per Session: 30 min   Stress: No Stress Concern Present (9/18/2023)    [employer] - Occupational Stress Questionnaire     Feeling of Stress : Not at all   Social Connections: Unknown (9/18/2023)    Social Connection and Isolation Panel [NHANES]     Frequency of Communication with Friends and Family: More than three times a week     Frequency of Social Gatherings with Friends and Family: More than three times a week     Active Member of Clubs or Organizations: Yes     Attends Club or Organization Meetings: More than 4 times per year     Marital Status:    Housing Stability: Low Risk  (9/18/2023)    Housing Stability Vital Sign     Unable to Pay for Housing in the Last Year: No     Number of Places Lived in the Last Year: 1     Unstable Housing in the Last Year: No       Review of patient's allergies indicates:  No Known Allergies    Review of Systems:  The patient reports stomach pain, back pain, anxiety and loss of balance.  Balance of review of systems is negative.    Physical Exam:  Vitals:    09/25/23 1316   BP: 120/60   Pulse: (!) 59   Resp: 18   Weight:  "66.7 kg (147 lb 2.5 oz)   Height: 5' 2" (1.575 m)   PainSc:   8   PainLoc: Back       Body mass index is 26.92 kg/m².    Gen: NAD  Psych: mood appropriate for given condition  HEENT: eyes anicteric   CV: RRR  HEENT: anicteric   Respiratory: non-labored, no signs of respiratory distress  Abd: non-distended  Skin: warm, dry and intact.  Gait: antalgic    Sensory:  Intact and symmetrical to light touch in L1-S1 dermatomes bilaterally.    Motor:     Right Left   L2/3 Iliacus Hip flexion  5  5   L3/4 Qudratus Femoris Knee Extension  5  5   L4/5 Tib Anterior Ankle Dorsiflexion   5  5   L5/S1 Extensor Hallicus Longus Great toe extension  5  5   S1/S2 Gastroc/Soleus Plantar Flexion  5  5      Right Left                  Patellar DTR 2+ 2+   Achilles DTR 0+ 0+                      Labs:  No results found for: "LABA1C", "HGBA1C"    Lab Results   Component Value Date    WBC 8.52 09/30/2022    HGB 13.4 09/30/2022    HCT 39.8 09/30/2022    MCV 95 09/30/2022     09/30/2022       Imaging:  MRI lumbar spine 06/16/2023  NOMENCLATURE: Five lumbar type vertebral bodies.     CORD/CAUDA EQUINA: Conus has normal size and signal and ends at a normal level of L1-L2.     ALIGNMENT: Trace retrolisthesis of L1 on L2.  4 mm retrolisthesis of L2 on L3.  Trace retrolisthesis of L3 on L4.  Trace grade 1 anterolisthesis of L4 on L5.     BONES: Vertebral body heights are maintained.  Type 1 endplate changes on the left at L1-2, L2-3 and to lesser extent at L3-4 and L4-5.  Several tiny Schmorl's nodes.  No aggressive bone marrow signal.     PARASPINAL AREA: Tiny possible synovial cyst in the left dorsal paraspinal soft tissues near the tip of the left L5-S1 facet (series 7, image 14).     LUMBAR DISC LEVELS:     T12-L1: No disc herniation or significant posterior osteophytic ridging.  No significant spinal canal or foraminal stenosis.  L1-L2: Trace retrolisthesis.  Mild disc bulge.  Disc height loss.  Mild bilateral facet hypertrophy.  " Minimal narrowing of the bilateral lateral recesses.  No significant spinal canal stenosis.  Mild left foraminal stenosis.  No significant spinal canal or foraminal stenosis.  L2-L3: Retrolisthesis.  Uncovering the disc and mild disc bulge.  Mild bilateral facet hypertrophy.  Ligamentum flavum thickening.  Moderate left and mild right narrowing of the lateral recesses.  No significant spinal canal stenosis.  Moderate right and mild left foraminal stenosis.  L3-L4: Trace retrolisthesis.  Mild disc bulge.  Mild-moderate right and mild left facet hypertrophy.  Ligamentum flavum thickening.  Mild narrowing of the bilateral lateral recesses.  No significant spinal canal stenosis.  Mild bilateral foraminal stenosis.  L4-L5: Trace anterolisthesis.  Mild disc bulge.  Moderate right and mild-moderate left facet hypertrophy.  Ligamentum flavum thickening.  Mild narrowing of the bilateral lateral recesses.  No significant spinal canal stenosis.  Mild bilateral foraminal stenosis.  L5-S1: Minimal disc bulge.  Mild-moderate left and mild right facet hypertrophy.  No significant spinal canal stenosis. Mild right greater than left foraminal stenosis.    Assessment:   Problem List Items Addressed This Visit    None  Visit Diagnoses       Lumbar radiculopathy    -  Primary    Relevant Orders    Case Request Operating Room: Injection-steroid-epidural-lumbar (Completed)    Dorsalgia                  Karla Bonilla is a 68 y.o. female patient who has a past medical history of Colon polyp and Personal history of nicotine dependence. She presents with low back and left leg pain.  She reports having a history of right-sided pain but this is currently not bothering her.  She describes pain in the left low back that radiates into the left buttock and posterior thigh down to her knee.  This has been present for the past 3 and half months.  She attributes this to an ankle fracture last year, being sedentary and then starting to walk  again.  She describes the pain as throbbing, deep, sharp and shooting.  It is worse with sitting, standing, bending, touching, walking, nighttime, morning, extension, flexing, lifting, getting out of a bed or a chair.  The only relief she has found has been with gabapentin 300 mg nightly.  She saw her primary care physician yesterday and was instructed to increase to twice a day.  She has been doing physician directed home exercises for the past several months without any relief and her pain is currently too severe to go to physical therapy.  She denies having weakness or numbness, denies bladder or bowel incontinence.    9/25/23 - Ms. Hector returns the office for follow-up.  Her left-sided pain has been doing very well however she reports a couple weeks ago she was doing some house chores and developed back pain with pain radiating down right leg.  She denies any new numbness or weakness.  Pain is constant, sharp, shooting.    - on exam she has full strength  - I independently reviewed her lumbar MRI and at L4-5 she has narrowing of her right lateral recess which I think is the cause of her right-sided radicular pain  - over the past 3 months she has completed formal physical therapy and tries her best to maintain PT directed home exercises.  At this time she reports that her current pain is too severe to participate in physical therapy  - her pain is limiting her mobility and interfering with the quality of her life  - we will schedule for an L4-5 WONG.  The risks and benefits of this intervention, and alternative therapies were discussed with the patient.  The discussion of risks included infection, bleeding, need for additional procedures or surgery, nerve damage.  Questions regarding the procedure, risks, expected outcome, and possible side effects were solicited and answered to the patient's satisfaction.  Karla Hector Irene wishes to proceed with the injection or procedure.  Written consent was  obtained.  - she has some tramadol that was prescribed by her PCP today to use on an as needed basis for severe pain while we set up injection      : Not applicable    This note was completed with dictation software and grammatical errors may exist.

## 2023-09-25 NOTE — H&P (VIEW-ONLY)
[facility] Follow Up Evaluation      Referred by: No ref. provider found    PCP: Dr. Adriane Granados    CC:   Chief Complaint   Patient presents with    Back Pain          9/25/2023     1:19 PM   Last 3 PDI Scores   Pain Disability Index (PDI) 40         Interval HPI 9/25/23:  Ms. Hector returns the office for follow-up.  Her left-sided pain has been doing very well however she reports a couple weeks ago she was doing some house chores and developed back pain with pain radiating down right leg.  She denies any new numbness or weakness.  Pain is constant, sharp, shooting.    HPI:   Karla Bonilla is a 68 y.o. female patient who has a past medical history of Colon polyp and Personal history of nicotine dependence. She presents with low back and left leg pain.  She reports having a history of right-sided pain but this is currently not bothering her.  She describes pain in the left low back that radiates into the left buttock and posterior thigh down to her knee.  This has been present for the past 3 and half months.  She attributes this to an ankle fracture last year, being sedentary and then starting to walk again.  She describes the pain as throbbing, deep, sharp and shooting.  It is worse with sitting, standing, bending, touching, walking, nighttime, morning, extension, flexing, lifting, getting out of a bed or a chair.  The only relief she has found has been with gabapentin 300 mg nightly.  She saw her primary care physician yesterday and was instructed to increase to twice a day.  She has been doing physician directed home exercises for the past several months without any relief and her pain is currently too severe to go to physical therapy.  She denies having weakness or numbness, denies bladder or bowel incontinence.    Pain Intervention History:  - s/p L3-4 WONG on 7/7/23 with 80-85% relief    Past Spine Surgical History:      Past and current medications:  Antineuropathics:  Gabapentin  300 mg twice daily  NSAIDs:  Took ibuprofen for several months but developed side effects and discontinued  Physical therapy:  Physician directed home exercise program for about 3 months.  Antidepressants:  Muscle relaxers:  Opioids:  Antiplatelets/Anticoagulants:    History:    Current Outpatient Medications:     gabapentin (NEURONTIN) 300 MG capsule, TAKE 1 CAPSULE(300 MG) BY MOUTH TWICE DAILY, Disp: 60 capsule, Rfl: 2    methylPREDNISolone (MEDROL DOSEPACK) 4 mg tablet, use as directed, Disp: 21 each, Rfl: 0    tiZANidine (ZANAFLEX) 2 MG tablet, Take 1 tablet (2 mg total) by mouth every 8 (eight) hours as needed (muscle spasm)., Disp: 30 tablet, Rfl: 0    traMADoL (ULTRAM) 50 mg tablet, Take 1 tablet (50 mg total) by mouth every 8 (eight) hours as needed for Pain., Disp: 21 tablet, Rfl: 0    ALPRAZolam (XANAX) 0.25 MG tablet, Take 1 tablet (0.25 mg total) by mouth 2 (two) times daily as needed for Anxiety., Disp: 30 tablet, Rfl: 0    Past Medical History:   Diagnosis Date    Colon polyp 2019    Personal history of nicotine dependence        Past Surgical History:   Procedure Laterality Date    CATARACT EXTRACTION, BILATERAL      COLONOSCOPY N/A 11/7/2019    Procedure: COLONOSCOPY;  Surgeon: Pan Duran MD;  Location: [facility];  Service: Endoscopy;  Laterality: N/A;    EPIDURAL STEROID INJECTION INTO LUMBAR SPINE Left 7/7/2023    Procedure: Injection-steroid-epidural-lumbar L3/4;  Surgeon: Brian Traylor MD;  Location: [facility] OR;  Service: Pain Management;  Laterality: Left;    TUBAL LIGATION         Family History   Problem Relation Age of Onset    Breast cancer Mother 70    Heart disease Father     Diabetes Brother        Social History     Socioeconomic History    Marital status:    Tobacco Use    Smoking status: Former     Current packs/day: 0.00     Average packs/day: 1 pack/day for 44.0 years (44.0 ttl pk-yrs)     Types: Cigarettes     Start date: 7/21/1976     Quit date: 7/21/2020     Years  since quitting: 3.1    Smokeless tobacco: Never   Substance and Sexual Activity    Alcohol use: Yes     Comment: occ glass of wine (1 a month)      Social Determinants of Health     Financial Resource Strain: Low Risk  (9/18/2023)    Overall Financial Resource Strain (CARDIA)     Difficulty of Paying Living Expenses: Not hard at all   Food Insecurity: No Food Insecurity (9/18/2023)    Hunger Vital Sign     Worried About Running Out of Food in the Last Year: Never true     Ran Out of Food in the Last Year: Never true   Transportation Needs: No Transportation Needs (9/18/2023)    PRAPARE - Transportation     Lack of Transportation (Medical): No     Lack of Transportation (Non-Medical): No   Physical Activity: Insufficiently Active (9/18/2023)    Exercise Vital Sign     Days of Exercise per Week: 3 days     Minutes of Exercise per Session: 30 min   Stress: No Stress Concern Present (9/18/2023)    [employer] - Occupational Stress Questionnaire     Feeling of Stress : Not at all   Social Connections: Unknown (9/18/2023)    Social Connection and Isolation Panel [NHANES]     Frequency of Communication with Friends and Family: More than three times a week     Frequency of Social Gatherings with Friends and Family: More than three times a week     Active Member of Clubs or Organizations: Yes     Attends Club or Organization Meetings: More than 4 times per year     Marital Status:    Housing Stability: Low Risk  (9/18/2023)    Housing Stability Vital Sign     Unable to Pay for Housing in the Last Year: No     Number of Places Lived in the Last Year: 1     Unstable Housing in the Last Year: No       Review of patient's allergies indicates:  No Known Allergies    Review of Systems:  The patient reports stomach pain, back pain, anxiety and loss of balance.  Balance of review of systems is negative.    Physical Exam:  Vitals:    09/25/23 1316   BP: 120/60   Pulse: (!) 59   Resp: 18   Weight:  "66.7 kg (147 lb 2.5 oz)   Height: 5' 2" (1.575 m)   PainSc:   8   PainLoc: Back       Body mass index is 26.92 kg/m².    Gen: NAD  Psych: mood appropriate for given condition  HEENT: eyes anicteric   CV: RRR  HEENT: anicteric   Respiratory: non-labored, no signs of respiratory distress  Abd: non-distended  Skin: warm, dry and intact.  Gait: antalgic    Sensory:  Intact and symmetrical to light touch in L1-S1 dermatomes bilaterally.    Motor:     Right Left   L2/3 Iliacus Hip flexion  5  5   L3/4 Qudratus Femoris Knee Extension  5  5   L4/5 Tib Anterior Ankle Dorsiflexion   5  5   L5/S1 Extensor Hallicus Longus Great toe extension  5  5   S1/S2 Gastroc/Soleus Plantar Flexion  5  5      Right Left                  Patellar DTR 2+ 2+   Achilles DTR 0+ 0+                      Labs:  No results found for: "LABA1C", "HGBA1C"    Lab Results   Component Value Date    WBC 8.52 09/30/2022    HGB 13.4 09/30/2022    HCT 39.8 09/30/2022    MCV 95 09/30/2022     09/30/2022       Imaging:  MRI lumbar spine 06/16/2023  NOMENCLATURE: Five lumbar type vertebral bodies.     CORD/CAUDA EQUINA: Conus has normal size and signal and ends at a normal level of L1-L2.     ALIGNMENT: Trace retrolisthesis of L1 on L2.  4 mm retrolisthesis of L2 on L3.  Trace retrolisthesis of L3 on L4.  Trace grade 1 anterolisthesis of L4 on L5.     BONES: Vertebral body heights are maintained.  Type 1 endplate changes on the left at L1-2, L2-3 and to lesser extent at L3-4 and L4-5.  Several tiny Schmorl's nodes.  No aggressive bone marrow signal.     PARASPINAL AREA: Tiny possible synovial cyst in the left dorsal paraspinal soft tissues near the tip of the left L5-S1 facet (series 7, image 14).     LUMBAR DISC LEVELS:     T12-L1: No disc herniation or significant posterior osteophytic ridging.  No significant spinal canal or foraminal stenosis.  L1-L2: Trace retrolisthesis.  Mild disc bulge.  Disc height loss.  Mild bilateral facet hypertrophy.  " Minimal narrowing of the bilateral lateral recesses.  No significant spinal canal stenosis.  Mild left foraminal stenosis.  No significant spinal canal or foraminal stenosis.  L2-L3: Retrolisthesis.  Uncovering the disc and mild disc bulge.  Mild bilateral facet hypertrophy.  Ligamentum flavum thickening.  Moderate left and mild right narrowing of the lateral recesses.  No significant spinal canal stenosis.  Moderate right and mild left foraminal stenosis.  L3-L4: Trace retrolisthesis.  Mild disc bulge.  Mild-moderate right and mild left facet hypertrophy.  Ligamentum flavum thickening.  Mild narrowing of the bilateral lateral recesses.  No significant spinal canal stenosis.  Mild bilateral foraminal stenosis.  L4-L5: Trace anterolisthesis.  Mild disc bulge.  Moderate right and mild-moderate left facet hypertrophy.  Ligamentum flavum thickening.  Mild narrowing of the bilateral lateral recesses.  No significant spinal canal stenosis.  Mild bilateral foraminal stenosis.  L5-S1: Minimal disc bulge.  Mild-moderate left and mild right facet hypertrophy.  No significant spinal canal stenosis. Mild right greater than left foraminal stenosis.    Assessment:   Problem List Items Addressed This Visit    None  Visit Diagnoses       Lumbar radiculopathy    -  Primary    Relevant Orders    Case Request Operating Room: Injection-steroid-epidural-lumbar (Completed)    Dorsalgia                  Karla Bonilla is a 68 y.o. female patient who has a past medical history of Colon polyp and Personal history of nicotine dependence. She presents with low back and left leg pain.  She reports having a history of right-sided pain but this is currently not bothering her.  She describes pain in the left low back that radiates into the left buttock and posterior thigh down to her knee.  This has been present for the past 3 and half months.  She attributes this to an ankle fracture last year, being sedentary and then starting to walk  again.  She describes the pain as throbbing, deep, sharp and shooting.  It is worse with sitting, standing, bending, touching, walking, nighttime, morning, extension, flexing, lifting, getting out of a bed or a chair.  The only relief she has found has been with gabapentin 300 mg nightly.  She saw her primary care physician yesterday and was instructed to increase to twice a day.  She has been doing physician directed home exercises for the past several months without any relief and her pain is currently too severe to go to physical therapy.  She denies having weakness or numbness, denies bladder or bowel incontinence.    9/25/23 - Ms. Hector returns the office for follow-up.  Her left-sided pain has been doing very well however she reports a couple weeks ago she was doing some house chores and developed back pain with pain radiating down right leg.  She denies any new numbness or weakness.  Pain is constant, sharp, shooting.    - on exam she has full strength  - I independently reviewed her lumbar MRI and at L4-5 she has narrowing of her right lateral recess which I think is the cause of her right-sided radicular pain  - over the past 3 months she has completed formal physical therapy and tries her best to maintain PT directed home exercises.  At this time she reports that her current pain is too severe to participate in physical therapy  - her pain is limiting her mobility and interfering with the quality of her life  - we will schedule for an L4-5 WONG.  The risks and benefits of this intervention, and alternative therapies were discussed with the patient.  The discussion of risks included infection, bleeding, need for additional procedures or surgery, nerve damage.  Questions regarding the procedure, risks, expected outcome, and possible side effects were solicited and answered to the patient's satisfaction.  Karla Hector Irene wishes to proceed with the injection or procedure.  Written consent was  obtained.  - she has some tramadol that was prescribed by her PCP today to use on an as needed basis for severe pain while we set up injection      : Not applicable    This note was completed with dictation software and grammatical errors may exist.

## 2023-09-29 ENCOUNTER — TELEPHONE (OUTPATIENT)
Dept: SURGERY | Facility: HOSPITAL | Age: 68
End: 2023-09-29
Payer: MEDICARE

## 2023-09-29 NOTE — TELEPHONE ENCOUNTER
Good Afternoon,    I just spoke with Ms. Karla for her pre-op call and she mentioned that she finished a steroid pack given to her by her PCP for her back pain. Last dose was on 9/23. She is scheduled for a lumbar WONG on 10/4. Is she OK to proceed?    Thank you!

## 2023-10-04 ENCOUNTER — HOSPITAL ENCOUNTER (OUTPATIENT)
Dept: RADIOLOGY | Facility: HOSPITAL | Age: 68
Discharge: HOME | End: 2023-10-04
Attending: ANESTHESIOLOGY | Admitting: ANESTHESIOLOGY
Payer: MEDICARE

## 2023-10-04 ENCOUNTER — HOSPITAL ENCOUNTER (OUTPATIENT)
Facility: HOSPITAL | Age: 68
Discharge: HOME | End: 2023-10-04
Attending: ANESTHESIOLOGY | Admitting: ANESTHESIOLOGY
Payer: MEDICARE

## 2023-10-04 VITALS
HEIGHT: 62 IN | RESPIRATION RATE: 16 BRPM | SYSTOLIC BLOOD PRESSURE: 111 MMHG | TEMPERATURE: 97 F | OXYGEN SATURATION: 98 % | BODY MASS INDEX: 27.05 KG/M2 | HEART RATE: 57 BPM | DIASTOLIC BLOOD PRESSURE: 57 MMHG | WEIGHT: 147 LBS

## 2023-10-04 DIAGNOSIS — M54.50 LOWER BACK PAIN: ICD-10-CM

## 2023-10-04 DIAGNOSIS — M54.16 LUMBAR RADICULOPATHY: Primary | ICD-10-CM

## 2023-10-04 PROCEDURE — 62323 NJX INTERLAMINAR LMBR/SAC: CPT | Mod: ,,, | Performed by: ANESTHESIOLOGY

## 2023-10-04 PROCEDURE — 62323 PR INJ LUMBAR/SACRAL, W/IMAGING GUIDANCE: ICD-10-PCS | Mod: ,,, | Performed by: ANESTHESIOLOGY

## 2023-10-04 PROCEDURE — 25500020 PHARM REV CODE 255: Mod: PO | Performed by: ANESTHESIOLOGY

## 2023-10-04 PROCEDURE — 25000003 PHARM REV CODE 250: Mod: PO | Performed by: ANESTHESIOLOGY

## 2023-10-04 PROCEDURE — 63600175 PHARM REV CODE 636 W HCPCS: Mod: PO | Performed by: ANESTHESIOLOGY

## 2023-10-04 PROCEDURE — 76000 FLUOROSCOPY <1 HR PHYS/QHP: CPT | Mod: TC,PO

## 2023-10-04 PROCEDURE — 62323 NJX INTERLAMINAR LMBR/SAC: CPT | Mod: PO | Performed by: ANESTHESIOLOGY

## 2023-10-04 RX ORDER — ALPRAZOLAM 0.5 MG/1
1 TABLET, ORALLY DISINTEGRATING ORAL ONCE
Status: COMPLETED | OUTPATIENT
Start: 2023-10-04 | End: 2023-10-04

## 2023-10-04 RX ORDER — METHYLPREDNISOLONE ACETATE 80 MG/ML
INJECTION, SUSPENSION INTRA-ARTICULAR; INTRALESIONAL; INTRAMUSCULAR; SOFT TISSUE
Status: DISCONTINUED | OUTPATIENT
Start: 2023-10-04 | End: 2023-10-04 | Stop reason: HOSPADM

## 2023-10-04 RX ORDER — ALPRAZOLAM 0.5 MG/1
1 TABLET ORAL ONCE
Status: DISCONTINUED | OUTPATIENT
Start: 2023-10-04 | End: 2023-10-04

## 2023-10-04 RX ORDER — LIDOCAINE HYDROCHLORIDE 10 MG/ML
INJECTION, SOLUTION EPIDURAL; INFILTRATION; INTRACAUDAL; PERINEURAL
Status: DISCONTINUED | OUTPATIENT
Start: 2023-10-04 | End: 2023-10-04 | Stop reason: HOSPADM

## 2023-10-04 RX ADMIN — ALPRAZOLAM 1 MG: 0.5 TABLET, ORALLY DISINTEGRATING ORAL at 01:10

## 2023-10-04 NOTE — OP NOTE
"Procedure Note    Procedure Date: 10/4/2023    Procedure Performed:  L4/5 lumbar interlaminar epidural steroid injection under fluoroscopy.    Indications:  Karla Bonilla presents with lumbar radiculitis/radiculopathy secondary to disc herniation, osteophyte/osteophyte complexes, and/or severe degenerative disc disease producing foraminal or central spinal stenosis.  The pain has been present for at least 4 weeks and the patient has failed to respond to noninvasive conservative care.  Pain rated by NRS at baseline prior to intervention is 6/10.  Their radiculitis/radiculopathy and/or neurogenic claudication is severe enough to greatly impact their quality of life or function.     Pre-op diagnosis: Lumbar Radiculopathy    Post-op diagnosis: same    Physician: Brian Traylor MD    IV anxiolysis medications: none    Medications injected: depomedrol 80mg, 1% Lidocaine 1ml, 2 mL sterile, preservative-free normal saline.    Local anesthetic used: 1% Lidocaine, 1 ml    Estimated Blood Loss: none    Complications:  none    Technique:  The patient was interviewed in the holding area and Risks/Benefits were discussed, including, but not limited to, the possibility of new or different pain, bleeding or infection.   All questions were answered.  The patient understood and accepted risks.  Consent was verfied.  A time-out was taken to identify patient and procedure prior to starting the procedure. The patient was placed in the prone position on the fluoroscopy table. The area of the lumbar spine was prepped with Chloraprep x2 and draped in a sterile manner. The L4/5 interspace was identified and marked under AP fluoroscopy. The skin and subcutaneous tissues overlying the targeted interspace were anesthetized with 3-5 mL of 1% lidocaine using a 25G, 1.5" needle.  A 20G, 3.5" Tuohy epidural needle was directed toward the interspace under fluoroscopic guidance until the ligamentum flavum was engaged. From this point, a " loss of resistance technique with a glass syringe and saline was used to identify entrance of the needle into the epidural space. Once loss of resistance was observed 1 mL of contrast solution was injected. An appropriate epidurogram was noted.  A 4 mL mixture consisting of saline, 1 mL 1% Lidocaine and 80 mg of depomedrol was injected slowly and without resistance.  The needle was flushed with normal saline and removed. The contrast was seen to be displaced after injection. Patient was awake/responsive during all injections.  The patient tolerated the procedure well and was transferred to the [facility] in stable condition.  The patient was monitored after the procedure and was given post-procedure and discharge instructions to follow at home. The patient was discharged in a stable condition.

## 2023-10-04 NOTE — DISCHARGE SUMMARY
[facility]  Discharge Note  Short Stay    Admit Date: 10/4/2023    Discharge Date: 10/4/2023    Attending Physician: Brian Traylor     Discharge Provider: Brian Traylor    Diagnoses:  There are no hospital problems to display for this patient.      Discharged Condition: Good    Final Diagnoses: Lumbar radiculopathy [M54.16]    Disposition: Home or Self Care    Hospital Course: No complications, uneventful    Outcome of Hospitalization, Treatment, Procedure, or Surgery:  Patient was admitted for outpatient interventional pain management procedure. The patient tolerated the procedure well with no complications.    Follow up/Patient Instructions:  Follow up as scheduled in Pain Management office in 2-3 weeks.  Patient has received instructions and follow up date and time.    Medications:  Continue previous medications    Discharge Procedure Orders   Notify your health care provider if you experience any of the following:  temperature >100.4     Notify your health care provider if you experience any of the following:  persistent nausea and vomiting or diarrhea     Notify your health care provider if you experience any of the following:  severe uncontrolled pain     Notify your health care provider if you experience any of the following:  redness, tenderness, or signs of infection (pain, swelling, redness, odor or green/yellow discharge around incision site)     Notify your health care provider if you experience any of the following:  difficulty breathing or increased cough     Notify your health care provider if you experience any of the following:  severe persistent headache     Notify your health care provider if you experience any of the following:  worsening rash     Notify your health care provider if you experience any of the following:  persistent dizziness, light-headedness, or visual disturbances     Notify your health care provider if you experience any of the following:  increased confusion or  weakness     Activity as tolerated

## 2023-10-11 ENCOUNTER — PATIENT MESSAGE (OUTPATIENT)
Dept: PAIN MEDICINE | Facility: CLINIC | Age: 68
End: 2023-10-11
Payer: MEDICARE

## 2023-10-11 DIAGNOSIS — M54.16 LUMBAR RADICULOPATHY: Primary | ICD-10-CM

## 2023-10-11 RX ORDER — PREGABALIN 75 MG/1
75 CAPSULE ORAL 2 TIMES DAILY
Qty: 60 CAPSULE | Refills: 1 | Status: SHIPPED | OUTPATIENT
Start: 2023-10-11 | End: 2023-10-23 | Stop reason: SDUPTHER

## 2023-10-11 NOTE — TELEPHONE ENCOUNTER
Stop gabapentin.  I've sent her in some Lyrica to trial for any nerve related pain she may be feeling

## 2023-10-23 ENCOUNTER — OFFICE VISIT (OUTPATIENT)
Dept: PAIN MEDICINE | Facility: CLINIC | Age: 68
End: 2023-10-23
Payer: MEDICARE

## 2023-10-23 VITALS
WEIGHT: 146.25 LBS | BODY MASS INDEX: 26.91 KG/M2 | SYSTOLIC BLOOD PRESSURE: 143 MMHG | HEIGHT: 62 IN | DIASTOLIC BLOOD PRESSURE: 63 MMHG | HEART RATE: 63 BPM | RESPIRATION RATE: 18 BRPM

## 2023-10-23 DIAGNOSIS — M54.16 LUMBAR RADICULOPATHY: Primary | ICD-10-CM

## 2023-10-23 DIAGNOSIS — M47.816 LUMBAR SPONDYLOSIS: ICD-10-CM

## 2023-10-23 DIAGNOSIS — M51.36 DDD (DEGENERATIVE DISC DISEASE), LUMBAR: ICD-10-CM

## 2023-10-23 DIAGNOSIS — M43.16 SPONDYLOLISTHESIS OF LUMBAR REGION: ICD-10-CM

## 2023-10-23 DIAGNOSIS — M41.9 SCOLIOSIS OF THORACOLUMBAR SPINE, UNSPECIFIED SCOLIOSIS TYPE: ICD-10-CM

## 2023-10-23 DIAGNOSIS — M79.18 MYOFASCIAL PAIN: ICD-10-CM

## 2023-10-23 PROCEDURE — 1101F PT FALLS ASSESS-DOCD LE1/YR: CPT | Mod: CPTII,S$GLB,, | Performed by: PHYSICIAN ASSISTANT

## 2023-10-23 PROCEDURE — 3078F DIAST BP <80 MM HG: CPT | Mod: CPTII,S$GLB,, | Performed by: PHYSICIAN ASSISTANT

## 2023-10-23 PROCEDURE — 1160F PR REVIEW ALL MEDS BY PRESCRIBER/CLIN PHARMACIST DOCUMENTED: ICD-10-PCS | Mod: CPTII,S$GLB,, | Performed by: PHYSICIAN ASSISTANT

## 2023-10-23 PROCEDURE — 3288F PR FALLS RISK ASSESSMENT DOCUMENTED: ICD-10-PCS | Mod: CPTII,S$GLB,, | Performed by: PHYSICIAN ASSISTANT

## 2023-10-23 PROCEDURE — 3008F PR BODY MASS INDEX (BMI) DOCUMENTED: ICD-10-PCS | Mod: CPTII,S$GLB,, | Performed by: PHYSICIAN ASSISTANT

## 2023-10-23 PROCEDURE — 3078F PR MOST RECENT DIASTOLIC BLOOD PRESSURE < 80 MM HG: ICD-10-PCS | Mod: CPTII,S$GLB,, | Performed by: PHYSICIAN ASSISTANT

## 2023-10-23 PROCEDURE — 99214 PR OFFICE/OUTPT VISIT, EST, LEVL IV, 30-39 MIN: ICD-10-PCS | Mod: S$GLB,,, | Performed by: PHYSICIAN ASSISTANT

## 2023-10-23 PROCEDURE — 1160F RVW MEDS BY RX/DR IN RCRD: CPT | Mod: CPTII,S$GLB,, | Performed by: PHYSICIAN ASSISTANT

## 2023-10-23 PROCEDURE — 1125F AMNT PAIN NOTED PAIN PRSNT: CPT | Mod: CPTII,S$GLB,, | Performed by: PHYSICIAN ASSISTANT

## 2023-10-23 PROCEDURE — 3077F PR MOST RECENT SYSTOLIC BLOOD PRESSURE >= 140 MM HG: ICD-10-PCS | Mod: CPTII,S$GLB,, | Performed by: PHYSICIAN ASSISTANT

## 2023-10-23 PROCEDURE — 99999 PR PBB SHADOW E&M-EST. PATIENT-LVL III: CPT | Mod: PBBFAC,,, | Performed by: PHYSICIAN ASSISTANT

## 2023-10-23 PROCEDURE — 1101F PR PT FALLS ASSESS DOC 0-1 FALLS W/OUT INJ PAST YR: ICD-10-PCS | Mod: CPTII,S$GLB,, | Performed by: PHYSICIAN ASSISTANT

## 2023-10-23 PROCEDURE — 3077F SYST BP >= 140 MM HG: CPT | Mod: CPTII,S$GLB,, | Performed by: PHYSICIAN ASSISTANT

## 2023-10-23 PROCEDURE — 3044F HG A1C LEVEL LT 7.0%: CPT | Mod: CPTII,S$GLB,, | Performed by: PHYSICIAN ASSISTANT

## 2023-10-23 PROCEDURE — 3288F FALL RISK ASSESSMENT DOCD: CPT | Mod: CPTII,S$GLB,, | Performed by: PHYSICIAN ASSISTANT

## 2023-10-23 PROCEDURE — 1159F PR MEDICATION LIST DOCUMENTED IN MEDICAL RECORD: ICD-10-PCS | Mod: CPTII,S$GLB,, | Performed by: PHYSICIAN ASSISTANT

## 2023-10-23 PROCEDURE — 3008F BODY MASS INDEX DOCD: CPT | Mod: CPTII,S$GLB,, | Performed by: PHYSICIAN ASSISTANT

## 2023-10-23 PROCEDURE — 3044F PR MOST RECENT HEMOGLOBIN A1C LEVEL <7.0%: ICD-10-PCS | Mod: CPTII,S$GLB,, | Performed by: PHYSICIAN ASSISTANT

## 2023-10-23 PROCEDURE — 99999 PR PBB SHADOW E&M-EST. PATIENT-LVL III: ICD-10-PCS | Mod: PBBFAC,,, | Performed by: PHYSICIAN ASSISTANT

## 2023-10-23 PROCEDURE — 1159F MED LIST DOCD IN RCRD: CPT | Mod: CPTII,S$GLB,, | Performed by: PHYSICIAN ASSISTANT

## 2023-10-23 PROCEDURE — 1125F PR PAIN SEVERITY QUANTIFIED, PAIN PRESENT: ICD-10-PCS | Mod: CPTII,S$GLB,, | Performed by: PHYSICIAN ASSISTANT

## 2023-10-23 PROCEDURE — 99214 OFFICE O/P EST MOD 30 MIN: CPT | Mod: S$GLB,,, | Performed by: PHYSICIAN ASSISTANT

## 2023-10-23 RX ORDER — PREGABALIN 75 MG/1
75 CAPSULE ORAL 3 TIMES DAILY
Qty: 90 CAPSULE | Refills: 2 | Status: SHIPPED | OUTPATIENT
Start: 2023-10-23 | End: 2024-01-02 | Stop reason: SDUPTHER

## 2023-10-23 NOTE — PROGRESS NOTES
[facility] Follow Up Evaluation      Referred by: No ref. provider found    PCP: Dr. Adriane Granados    CC:   Chief Complaint   Patient presents with    Follow-up          10/23/2023     8:26 AM 9/25/2023     1:19 PM   Last 3 PDI Scores   Pain Disability Index (PDI) 30 40         Interval HPI 10/23/23:  Ms. Hector returns the office for follow-up.  She is status post L4/5 interlaminar epidural steroid injection on 10/04/2023 with 50% relief.  She continues to have pain in the right low back, right buttock as well as the right lower lateral shin, top of her foot causing numbness in her toes.  She does report some relief with Lyrica, ice and heat.  She has been doing 30 minutes of home exercises every day.  Otherwise, her pain is typically worse with walking and with normal activities of daily living so she is not as active as she used to be.  She also states that they have a water bed and may be considering getting a new bed.    HPI:   Karla Hector Irene is a 68 y.o. female patient who has a past medical history of Colon polyp and Personal history of nicotine dependence. She presents with low back and left leg pain.  She reports having a history of right-sided pain but this is currently not bothering her.  She describes pain in the left low back that radiates into the left buttock and posterior thigh down to her knee.  This has been present for the past 3 and half months.  She attributes this to an ankle fracture last year, being sedentary and then starting to walk again.  She describes the pain as throbbing, deep, sharp and shooting.  It is worse with sitting, standing, bending, touching, walking, nighttime, morning, extension, flexing, lifting, getting out of a bed or a chair.  The only relief she has found has been with gabapentin 300 mg nightly.  She saw her primary care physician yesterday and was instructed to increase to twice a day.  She has been doing physician directed home exercises  for the past several months without any relief and her pain is currently too severe to go to physical therapy.  She denies having weakness or numbness, denies bladder or bowel incontinence.    Pain Intervention History:  - s/p L3-4 WONG on 7/7/23 with 80-85% relief  - She is status post L4/5 interlaminar epidural steroid injection on 10/04/2023 with 50% relief.    Past Spine Surgical History:      Past and current medications:  Antineuropathics:  Gabapentin 300 mg twice daily  NSAIDs:  Took ibuprofen for several months but developed side effects and discontinued  Physical therapy:  Physician directed home exercise program for about 3 months.  Antidepressants:  Muscle relaxers:  Opioids:  Antiplatelets/Anticoagulants:    History:    Current Outpatient Medications:     ALPRAZolam (XANAX) 0.25 MG tablet, Take 1 tablet (0.25 mg total) by mouth 2 (two) times daily as needed for Anxiety., Disp: 30 tablet, Rfl: 0    pregabalin (LYRICA) 75 MG capsule, Take 1 capsule (75 mg total) by mouth 3 (three) times daily., Disp: 90 capsule, Rfl: 2    tiZANidine (ZANAFLEX) 2 MG tablet, TAKE 1 TABLET(2 MG) BY MOUTH EVERY 8 HOURS AS NEEDED FOR MUSCLE SPASM (Patient not taking: Reported on 10/23/2023), Disp: 30 tablet, Rfl: 0    traMADoL (ULTRAM) 50 mg tablet, Take 1 tablet (50 mg total) by mouth every 8 (eight) hours as needed for Pain. (Patient not taking: Reported on 10/23/2023), Disp: 21 tablet, Rfl: 0    Past Medical History:   Diagnosis Date    Colon polyp 2019    Personal history of nicotine dependence        Past Surgical History:   Procedure Laterality Date    CATARACT EXTRACTION, BILATERAL      COLONOSCOPY N/A 11/7/2019    Procedure: COLONOSCOPY;  Surgeon: Pan Duran MD;  Location: [facility];  Service: Endoscopy;  Laterality: N/A;    EPIDURAL STEROID INJECTION INTO LUMBAR SPINE Left 7/7/2023    Procedure: Injection-steroid-epidural-lumbar L3/4;  Surgeon: Brian Traylor MD;  Location: [facility] OR;  Service: Pain Management;   Laterality: Left;    EPIDURAL STEROID INJECTION INTO LUMBAR SPINE N/A 10/4/2023    Procedure: Injection-steroid-epidural-lumbar;  Surgeon: Brian Traylor MD;  Location: [facility];  Service: Pain Management;  Laterality: N/A;  L4/5    TUBAL LIGATION         Family History   Problem Relation Age of Onset    Breast cancer Mother 70    Heart disease Father     Diabetes Brother        Social History     Socioeconomic History    Marital status:    Tobacco Use    Smoking status: Former     Current packs/day: 0.00     Average packs/day: 1 pack/day for 44.0 years (44.0 ttl pk-yrs)     Types: Cigarettes     Start date: 7/21/1976     Quit date: 7/21/2020     Years since quitting: 3.2    Smokeless tobacco: Never   Substance and Sexual Activity    Alcohol use: Yes     Comment: occ glass of wine (1 a month)     Drug use: Never     Social Determinants of Health     Financial Resource Strain: Low Risk  (9/26/2023)    Overall Financial Resource Strain (CARDIA)     Difficulty of Paying Living Expenses: Not hard at all   Food Insecurity: No Food Insecurity (9/26/2023)    Hunger Vital Sign     Worried About Running Out of Food in the Last Year: Never true     Ran Out of Food in the Last Year: Never true   Transportation Needs: No Transportation Needs (9/26/2023)    PRAPARE - Transportation     Lack of Transportation (Medical): No     Lack of Transportation (Non-Medical): No   Physical Activity: Insufficiently Active (9/26/2023)    Exercise Vital Sign     Days of Exercise per Week: 2 days     Minutes of Exercise per Session: 30 min   Stress: No Stress Concern Present (9/26/2023)    [place] [employer] - Occupational Stress Questionnaire     Feeling of Stress : Only a little   Social Connections: Unknown (9/26/2023)    Social Connection and Isolation Panel [NHANES]     Frequency of Communication with Friends and Family: More than three times a week     Frequency of Social Gatherings with Friends and Family:  "More than three times a week     Active Member of Clubs or Organizations: Yes     Attends Club or Organization Meetings: More than 4 times per year     Marital Status:    Housing Stability: Low Risk  (9/26/2023)    Housing Stability Vital Sign     Unable to Pay for Housing in the Last Year: No     Number of Places Lived in the Last Year: 1     Unstable Housing in the Last Year: No       Review of patient's allergies indicates:  No Known Allergies    Review of Systems:  The patient reports stomach pain, back pain, anxiety and loss of balance.  Balance of review of systems is negative.    Physical Exam:  Vitals:    10/23/23 0824   BP: (!) 143/63   Pulse: 63   Resp: 18   Weight: 66.3 kg (146 lb 4.4 oz)   Height: 5' 2" (1.575 m)   PainSc:   4   PainLoc: Back       Body mass index is 26.75 kg/m².    Gen: NAD  Psych: mood appropriate for given condition  HEENT: eyes anicteric   CV: RRR  HEENT: anicteric   Respiratory: non-labored, no signs of respiratory distress  Abd: non-distended  Skin: warm, dry and intact.  Gait: antalgic    Sensory:  Intact and symmetrical to light touch in L1-S1 dermatomes bilaterally.    Motor:     Right Left   L2/3 Iliacus Hip flexion  5  5   L3/4 Qudratus Femoris Knee Extension  5  5   L4/5 Tib Anterior Ankle Dorsiflexion   5  5   L5/S1 Extensor Hallicus Longus Great toe extension  5  5   S1/S2 Gastroc/Soleus Plantar Flexion  5  5      Right Left                  Patellar DTR 2+ 2+   Achilles DTR 0+ 0+                      Labs:  Lab Results   Component Value Date    HGBA1C 5.4 10/04/2023       Lab Results   Component Value Date    WBC 5.65 10/04/2023    HGB 13.5 10/04/2023    HCT 40.9 10/04/2023    MCV 95 10/04/2023     10/04/2023       Imaging:  MRI lumbar spine 06/16/2023  NOMENCLATURE: Five lumbar type vertebral bodies.     CORD/CAUDA EQUINA: Conus has normal size and signal and ends at a normal level of L1-L2.     ALIGNMENT: Trace retrolisthesis of L1 on L2.  4 mm " retrolisthesis of L2 on L3.  Trace retrolisthesis of L3 on L4.  Trace grade 1 anterolisthesis of L4 on L5.     BONES: Vertebral body heights are maintained.  Type 1 endplate changes on the left at L1-2, L2-3 and to lesser extent at L3-4 and L4-5.  Several tiny Schmorl's nodes.  No aggressive bone marrow signal.     PARASPINAL AREA: Tiny possible synovial cyst in the left dorsal paraspinal soft tissues near the tip of the left L5-S1 facet (series 7, image 14).     LUMBAR DISC LEVELS:     T12-L1: No disc herniation or significant posterior osteophytic ridging.  No significant spinal canal or foraminal stenosis.  L1-L2: Trace retrolisthesis.  Mild disc bulge.  Disc height loss.  Mild bilateral facet hypertrophy.  Minimal narrowing of the bilateral lateral recesses.  No significant spinal canal stenosis.  Mild left foraminal stenosis.  No significant spinal canal or foraminal stenosis.  L2-L3: Retrolisthesis.  Uncovering the disc and mild disc bulge.  Mild bilateral facet hypertrophy.  Ligamentum flavum thickening.  Moderate left and mild right narrowing of the lateral recesses.  No significant spinal canal stenosis.  Moderate right and mild left foraminal stenosis.  L3-L4: Trace retrolisthesis.  Mild disc bulge.  Mild-moderate right and mild left facet hypertrophy.  Ligamentum flavum thickening.  Mild narrowing of the bilateral lateral recesses.  No significant spinal canal stenosis.  Mild bilateral foraminal stenosis.  L4-L5: Trace anterolisthesis.  Mild disc bulge.  Moderate right and mild-moderate left facet hypertrophy.  Ligamentum flavum thickening.  Mild narrowing of the bilateral lateral recesses.  No significant spinal canal stenosis.  Mild bilateral foraminal stenosis.  L5-S1: Minimal disc bulge.  Mild-moderate left and mild right facet hypertrophy.  No significant spinal canal stenosis. Mild right greater than left foraminal stenosis.    Assessment:   Problem List Items Addressed This Visit    None  Visit  Diagnoses       Lumbar radiculopathy    -  Primary    Relevant Orders    Ambulatory referral/consult to Physical/Occupational Therapy    Spondylolisthesis of lumbar region        Relevant Orders    Ambulatory referral/consult to Physical/Occupational Therapy    Myofascial pain        Relevant Orders    Ambulatory referral/consult to Physical/Occupational Therapy    Lumbar spondylosis        DDD (degenerative disc disease), lumbar        Scoliosis of thoracolumbar spine, unspecified scoliosis type                  Karla Bonilla is a 68 y.o. female patient who has a past medical history of Colon polyp and Personal history of nicotine dependence. She presents with low back and left leg pain.  She reports having a history of right-sided pain but this is currently not bothering her.  She describes pain in the left low back that radiates into the left buttock and posterior thigh down to her knee.  This has been present for the past 3 and half months.  She attributes this to an ankle fracture last year, being sedentary and then starting to walk again.  She describes the pain as throbbing, deep, sharp and shooting.  It is worse with sitting, standing, bending, touching, walking, nighttime, morning, extension, flexing, lifting, getting out of a bed or a chair.  The only relief she has found has been with gabapentin 300 mg nightly.  She saw her primary care physician yesterday and was instructed to increase to twice a day.  She has been doing physician directed home exercises for the past several months without any relief and her pain is currently too severe to go to physical therapy.  She denies having weakness or numbness, denies bladder or bowel incontinence.    10/23/23 - Ms. Hector returns the office for follow-up.  She is status post L4/5 interlaminar epidural steroid injection on 10/04/2023 with 50% relief.  She continues to have pain in the right low back, right buttock as well as the right lower lateral  shin, top of her foot causing numbness in her toes.  She does report some relief with Lyrica, ice and heat.  She has been doing 30 minutes of home exercises every day.  Otherwise, her pain is typically worse with walking and with normal activities of daily living so she is not as active as she used to be.  She also states that they have a water bed and may be considering getting a new bed.    1. The patient had moderate relief following the epidural steroid injection but continues to have radicular pain.  . Hortencia has provided a prescription for Lyrica increasing to 75 mg 3 times a day if tolerated. I have reviewed the [place] Board of Pharmacy website and there are no abberancies.    2. I completed orders for physical therapy and dry needling at [facility].  3. Follow-up in 2 months or sooner as needed.      : Not applicable    This note was completed with dictation software and grammatical errors may exist.

## 2023-11-30 ENCOUNTER — TELEPHONE (OUTPATIENT)
Dept: NEUROLOGY | Facility: CLINIC | Age: 68
End: 2023-11-30
Payer: MEDICARE

## 2023-12-01 ENCOUNTER — OFFICE VISIT (OUTPATIENT)
Dept: PHYSICAL MEDICINE AND REHAB | Facility: CLINIC | Age: 68
End: 2023-12-01
Payer: MEDICARE

## 2023-12-01 VITALS — WEIGHT: 146.19 LBS | HEIGHT: 62 IN | BODY MASS INDEX: 26.9 KG/M2

## 2023-12-01 DIAGNOSIS — G62.89 OTHER POLYNEUROPATHY: ICD-10-CM

## 2023-12-01 DIAGNOSIS — M54.42 CHRONIC BILATERAL LOW BACK PAIN WITH BILATERAL SCIATICA: ICD-10-CM

## 2023-12-01 DIAGNOSIS — G89.29 CHRONIC BILATERAL LOW BACK PAIN WITH BILATERAL SCIATICA: ICD-10-CM

## 2023-12-01 DIAGNOSIS — M54.41 CHRONIC BILATERAL LOW BACK PAIN WITH BILATERAL SCIATICA: ICD-10-CM

## 2023-12-01 PROCEDURE — 99999 PR PBB SHADOW E&M-EST. PATIENT-LVL II: ICD-10-PCS | Mod: PBBFAC,,, | Performed by: PHYSICAL MEDICINE & REHABILITATION

## 2023-12-01 PROCEDURE — 95886 PR EMG COMPLETE, W/ NERVE CONDUCTION STUDIES, 5+ MUSCLES: ICD-10-PCS | Mod: S$GLB,,, | Performed by: PHYSICAL MEDICINE & REHABILITATION

## 2023-12-01 PROCEDURE — 95909 PR NERVE CONDUCTION STUDY; 5-6 STUDIES: ICD-10-PCS | Mod: S$GLB,,, | Performed by: PHYSICAL MEDICINE & REHABILITATION

## 2023-12-01 PROCEDURE — 95886 MUSC TEST DONE W/N TEST COMP: CPT | Mod: S$GLB,,, | Performed by: PHYSICAL MEDICINE & REHABILITATION

## 2023-12-01 PROCEDURE — 99999 PR PBB SHADOW E&M-EST. PATIENT-LVL II: CPT | Mod: PBBFAC,,, | Performed by: PHYSICAL MEDICINE & REHABILITATION

## 2023-12-01 PROCEDURE — 99499 NO LOS: ICD-10-PCS | Mod: S$GLB,,, | Performed by: PHYSICAL MEDICINE & REHABILITATION

## 2023-12-01 PROCEDURE — 95909 NRV CNDJ TST 5-6 STUDIES: CPT | Mod: S$GLB,,, | Performed by: PHYSICAL MEDICINE & REHABILITATION

## 2023-12-01 PROCEDURE — 99499 UNLISTED E&M SERVICE: CPT | Mod: S$GLB,,, | Performed by: PHYSICAL MEDICINE & REHABILITATION

## 2023-12-01 NOTE — PROGRESS NOTES
[facility]  [address] [facility] [address]  [place] [address]             Full Name: Karla Bonilla YOB: 1955  MRN: [id number]  History: Chronic low back pain with radicular symptoms. No hx of back surgery. Right leg/foot numbness.       Visit Date: 12/1/2023 10:53 AM  Age: 68 Years  Examining Physician: Lisa Collins DO  Referring Physician: Brian Traylor      Sensory NCS      Nerve / Sites Rec. Site Onset Lat Peak Lat NP Amp PP Amp Segments Distance Velocity     ms ms µV µV  cm m/s   L Sural - Ankle (Calf)      Calf Ankle 3.10 3.65 6.9 4.7 Calf - Ankle 14 45   R Sural - Ankle (Calf)      Calf Ankle NR NR NR NR Calf - Ankle 14 NR   R Superficial peroneal - Ankle      Lat leg Ankle NR NR NR NR Lat leg - Ankle 14 NR       Motor NCS      Nerve / Sites Muscle Latency Amplitude Amp % Duration Segments Distance Lat Diff Velocity     ms mV % ms  cm ms m/s   L Peroneal - EDB      Ankle EDB 4.35 3.7 100 5.06 Ankle - EDB 8        Fib head EDB 10.35 2.1 55.8 5.79 Fib head - Ankle 28 6.00 47      Pop fossa EDB 11.92 2.4 65.1 5.67 Pop fossa - Fib head 8 1.56 51   R Peroneal - EDB      Ankle EDB 4.08 3.8 100 5.65 Ankle - EDB 8        Fib head EDB 10.13 3.0 79.2 6.06 Fib head - Ankle 29 6.04 48      Pop fossa EDB 11.52 3.5 90.3 5.87 Pop fossa - Fib head 9 1.40 64       EMG Summary Table     Spontaneous MUAP Recruitment   Muscle IA Fib PSW Fasc CRD Amp Dur. Poly Pattern   L. Quadriceps N None None None None N N None N   L. Tibialis anterior N None None None None N N None N   L. Peroneus longus N None None None None N N None N   L. Gastrocnemius (Medial head) N None None None None N N None N   L. Gluteus medius N None None None None N N None N   R. Quadriceps N None None None None N N None N   R. Tibialis anterior N None None None None N N None N   R. Peroneus longus N None None None None N N None N   R. Gastrocnemius (Medial head) N None None None None N N None N   R. Gluteus medius N None None None None N  N None N       Summary    The motor conduction test was normal in all 2 of the tested nerves: L Peroneal - EDB, R Peroneal - EDB.    The sensory conduction test was performed on 3 nerve(s). The results were normal in 1 nerve(s): L Sural - Ankle (Calf). Results outside the specified normal range were found in 2 nerve(s), as follows:  In the R Sural - Ankle (Calf) study  the response was considered absent for Calf stimulation  In the R Superficial peroneal - Ankle study  the response was considered absent for Lat leg stimulation    The needle EMG study was normal in all 10 tested muscles: L. Quadriceps, L. Tibialis anterior, L. Peroneus longus, L. Gastrocnemius (Medial head), L. Gluteus medius, R. Quadriceps, R. Tibialis anterior, R. Peroneus longus, R. Gastrocnemius (Medial head), R. Gluteus medius.      Impression:  Abnormal study.   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   No electrophysiologic evidence of bilateral lumbosacral radiculopathy/plexopathy, or peripheral neuropathy in the left lower extremity.     ------------------------------  Lisa Collins, DO

## 2023-12-27 RX ORDER — DULOXETIN HYDROCHLORIDE 20 MG/1
20 CAPSULE, DELAYED RELEASE ORAL DAILY
Qty: 30 CAPSULE | Refills: 1 | Status: SHIPPED | OUTPATIENT
Start: 2023-12-27 | End: 2024-01-02

## 2024-01-02 ENCOUNTER — OFFICE VISIT (OUTPATIENT)
Dept: PAIN MEDICINE | Facility: CLINIC | Age: 69
End: 2024-01-02
Payer: MEDICARE

## 2024-01-02 VITALS
HEIGHT: 62 IN | WEIGHT: 150.25 LBS | HEART RATE: 61 BPM | BODY MASS INDEX: 27.65 KG/M2 | SYSTOLIC BLOOD PRESSURE: 128 MMHG | DIASTOLIC BLOOD PRESSURE: 90 MMHG

## 2024-01-02 DIAGNOSIS — M54.16 LUMBAR RADICULOPATHY: Primary | ICD-10-CM

## 2024-01-02 DIAGNOSIS — M43.16 SPONDYLOLISTHESIS OF LUMBAR REGION: ICD-10-CM

## 2024-01-02 PROCEDURE — 1160F RVW MEDS BY RX/DR IN RCRD: CPT | Mod: CPTII,S$GLB,, | Performed by: PHYSICIAN ASSISTANT

## 2024-01-02 PROCEDURE — 99214 OFFICE O/P EST MOD 30 MIN: CPT | Mod: S$GLB,,, | Performed by: PHYSICIAN ASSISTANT

## 2024-01-02 PROCEDURE — 99999 PR PBB SHADOW E&M-EST. PATIENT-LVL III: CPT | Mod: PBBFAC,,, | Performed by: PHYSICIAN ASSISTANT

## 2024-01-02 PROCEDURE — 3288F FALL RISK ASSESSMENT DOCD: CPT | Mod: CPTII,S$GLB,, | Performed by: PHYSICIAN ASSISTANT

## 2024-01-02 PROCEDURE — 1125F AMNT PAIN NOTED PAIN PRSNT: CPT | Mod: CPTII,S$GLB,, | Performed by: PHYSICIAN ASSISTANT

## 2024-01-02 PROCEDURE — 3074F SYST BP LT 130 MM HG: CPT | Mod: CPTII,S$GLB,, | Performed by: PHYSICIAN ASSISTANT

## 2024-01-02 PROCEDURE — 3080F DIAST BP >= 90 MM HG: CPT | Mod: CPTII,S$GLB,, | Performed by: PHYSICIAN ASSISTANT

## 2024-01-02 PROCEDURE — 1101F PT FALLS ASSESS-DOCD LE1/YR: CPT | Mod: CPTII,S$GLB,, | Performed by: PHYSICIAN ASSISTANT

## 2024-01-02 PROCEDURE — 1159F MED LIST DOCD IN RCRD: CPT | Mod: CPTII,S$GLB,, | Performed by: PHYSICIAN ASSISTANT

## 2024-01-02 PROCEDURE — 3008F BODY MASS INDEX DOCD: CPT | Mod: CPTII,S$GLB,, | Performed by: PHYSICIAN ASSISTANT

## 2024-01-02 RX ORDER — PREGABALIN 75 MG/1
75 CAPSULE ORAL 3 TIMES DAILY
Qty: 90 CAPSULE | Refills: 2 | Status: SHIPPED | OUTPATIENT
Start: 2024-01-02 | End: 2024-04-01

## 2024-01-02 RX ORDER — DULOXETIN HYDROCHLORIDE 60 MG/1
60 CAPSULE, DELAYED RELEASE ORAL DAILY
Qty: 30 CAPSULE | Refills: 11 | Status: SHIPPED | OUTPATIENT
Start: 2024-01-02 | End: 2025-01-01

## 2024-01-02 NOTE — PROGRESS NOTES
[facility] Follow Up Evaluation      Referred by: No ref. provider found    PCP: Dr. Adriane Granados    CC:   Chief Complaint   Patient presents with    Low-back Pain     Left and right sided           1/2/2024    10:49 AM 10/23/2023     8:26 AM 9/25/2023     1:19 PM   Last 3 PDI Scores   Pain Disability Index (PDI) 10 30 40         Interval HPI 1/2/24:  Ms. Hector returns the office for follow-up.  Since her last visit she has been participating in physical therapy at [facility]. Between WONG, physical therapy, Lyrica and Cymbalta she feels that her pain is now tolerable.  She notes that once she started Cymbalta specifically her leg pain started to improve.  Her  is present today and also notes that he has seen a difference in her pain.    HPI:   Karla Bonilla is a 68 y.o. female patient who has a past medical history of Colon polyp and Personal history of nicotine dependence. She presents with low back and left leg pain.  She reports having a history of right-sided pain but this is currently not bothering her.  She describes pain in the left low back that radiates into the left buttock and posterior thigh down to her knee.  This has been present for the past 3 and half months.  She attributes this to an ankle fracture last year, being sedentary and then starting to walk again.  She describes the pain as throbbing, deep, sharp and shooting.  It is worse with sitting, standing, bending, touching, walking, nighttime, morning, extension, flexing, lifting, getting out of a bed or a chair.  The only relief she has found has been with gabapentin 300 mg nightly.  She saw her primary care physician yesterday and was instructed to increase to twice a day.  She has been doing physician directed home exercises for the past several months without any relief and her pain is currently too severe to go to physical therapy.  She denies having weakness or numbness, denies bladder or bowel  incontinence.    Pain Intervention History:  - s/p L3-4 WONG on 7/7/23 with 80-85% relief  - She is status post L4/5 interlaminar epidural steroid injection on 10/04/2023 with 50% relief.    Past Spine Surgical History:      Past and current medications:  Antineuropathics:  Gabapentin 300 mg twice daily  NSAIDs:  Took ibuprofen for several months but developed side effects and discontinued  Physical therapy:  Physician directed home exercise program for about 3 months.  Antidepressants:  Muscle relaxers:  Opioids:  Antiplatelets/Anticoagulants:    History:    Current Outpatient Medications:     ALPRAZolam (XANAX) 0.25 MG tablet, Take 1 tablet (0.25 mg total) by mouth 2 (two) times daily as needed for Anxiety., Disp: 30 tablet, Rfl: 0    DULoxetine (CYMBALTA) 60 MG capsule, Take 1 capsule (60 mg total) by mouth once daily., Disp: 30 capsule, Rfl: 11    pregabalin (LYRICA) 75 MG capsule, Take 1 capsule (75 mg total) by mouth 3 (three) times daily., Disp: 90 capsule, Rfl: 2    Past Medical History:   Diagnosis Date    Colon polyp 2019    Personal history of nicotine dependence        Past Surgical History:   Procedure Laterality Date    CATARACT EXTRACTION, BILATERAL      COLONOSCOPY N/A 11/7/2019    Procedure: COLONOSCOPY;  Surgeon: Pan Duran MD;  Location: [facility];  Service: Endoscopy;  Laterality: N/A;    EPIDURAL STEROID INJECTION INTO LUMBAR SPINE Left 7/7/2023    Procedure: Injection-steroid-epidural-lumbar L3/4;  Surgeon: Brian Traylor MD;  Location: [facility];  Service: Pain Management;  Laterality: Left;    EPIDURAL STEROID INJECTION INTO LUMBAR SPINE N/A 10/4/2023    Procedure: Injection-steroid-epidural-lumbar;  Surgeon: Brian Traylor MD;  Location: [facility] OR;  Service: Pain Management;  Laterality: N/A;  L4/5    TUBAL LIGATION         Family History   Problem Relation Age of Onset    Breast cancer Mother 70    Heart disease Father     Diabetes Brother        Social History     Socioeconomic  History    Marital status:    Tobacco Use    Smoking status: Former     Current packs/day: 0.00     Average packs/day: 1 pack/day for 44.0 years (44.0 ttl pk-yrs)     Types: Cigarettes     Start date: 7/21/1976     Quit date: 7/21/2020     Years since quitting: 3.4    Smokeless tobacco: Never   Substance and Sexual Activity    Alcohol use: Yes     Comment: occ glass of wine (1 a month)     Drug use: Never     Social Determinants of Health     Financial Resource Strain: Low Risk  (9/26/2023)    Overall Financial Resource Strain (CARDIA)     Difficulty of Paying Living Expenses: Not hard at all   Food Insecurity: No Food Insecurity (9/26/2023)    Hunger Vital Sign     Worried About Running Out of Food in the Last Year: Never true     Ran Out of Food in the Last Year: Never true   Transportation Needs: No Transportation Needs (9/26/2023)    PRAPARE - Transportation     Lack of Transportation (Medical): No     Lack of Transportation (Non-Medical): No   Physical Activity: Insufficiently Active (9/26/2023)    Exercise Vital Sign     Days of Exercise per Week: 2 days     Minutes of Exercise per Session: 30 min   Stress: No Stress Concern Present (9/26/2023)    Citizen of [place] of Occupational [employer] - Occupational Stress Questionnaire     Feeling of Stress : Only a little   Social Connections: Unknown (9/26/2023)    Social Connection and Isolation Panel [NHANES]     Frequency of Communication with Friends and Family: More than three times a week     Frequency of Social Gatherings with Friends and Family: More than three times a week     Active Member of Clubs or Organizations: Yes     Attends Club or Organization Meetings: More than 4 times per year     Marital Status:    Housing Stability: Low Risk  (9/26/2023)    Housing Stability Vital Sign     Unable to Pay for Housing in the Last Year: No     Number of Places Lived in the Last Year: 1     Unstable Housing in the Last Year: No       Review of patient's  "allergies indicates:  No Known Allergies    Review of Systems:  The patient reports stomach pain, back pain, anxiety and loss of balance.  Balance of review of systems is negative.    Physical Exam:  Vitals:    01/02/24 1046   BP: (!) 128/90   Pulse: 61   Weight: 68.2 kg (150 lb 3.9 oz)   Height: 5' 2" (1.575 m)   PainSc:   3   PainLoc: Back       Body mass index is 27.48 kg/m².    Gen: NAD  Psych: mood appropriate for given condition  HEENT: eyes anicteric   CV: RRR  HEENT: anicteric   Respiratory: non-labored, no signs of respiratory distress  Abd: non-distended  Skin: warm, dry and intact.  Gait: antalgic    Sensory:  Intact and symmetrical to light touch in L1-S1 dermatomes bilaterally.    Motor:     Right Left   L2/3 Iliacus Hip flexion  5  5   L3/4 Qudratus Femoris Knee Extension  5  5   L4/5 Tib Anterior Ankle Dorsiflexion   5  5   L5/S1 Extensor Hallicus Longus Great toe extension  5  5   S1/S2 Gastroc/Soleus Plantar Flexion  5  5      Right Left                  Patellar DTR 2+ 2+   Achilles DTR 0+ 0+                      Labs:  Lab Results   Component Value Date    HGBA1C 5.4 10/04/2023       Lab Results   Component Value Date    WBC 5.65 10/04/2023    HGB 13.5 10/04/2023    HCT 40.9 10/04/2023    MCV 95 10/04/2023     10/04/2023       Imaging:  MRI lumbar spine 06/16/2023  NOMENCLATURE: Five lumbar type vertebral bodies.     CORD/CAUDA EQUINA: Conus has normal size and signal and ends at a normal level of L1-L2.     ALIGNMENT: Trace retrolisthesis of L1 on L2.  4 mm retrolisthesis of L2 on L3.  Trace retrolisthesis of L3 on L4.  Trace grade 1 anterolisthesis of L4 on L5.     BONES: Vertebral body heights are maintained.  Type 1 endplate changes on the left at L1-2, L2-3 and to lesser extent at L3-4 and L4-5.  Several tiny Schmorl's nodes.  No aggressive bone marrow signal.     PARASPINAL AREA: Tiny possible synovial cyst in the left dorsal paraspinal soft tissues near the tip of the left L5-S1 " facet (series 7, image 14).     LUMBAR DISC LEVELS:     T12-L1: No disc herniation or significant posterior osteophytic ridging.  No significant spinal canal or foraminal stenosis.  L1-L2: Trace retrolisthesis.  Mild disc bulge.  Disc height loss.  Mild bilateral facet hypertrophy.  Minimal narrowing of the bilateral lateral recesses.  No significant spinal canal stenosis.  Mild left foraminal stenosis.  No significant spinal canal or foraminal stenosis.  L2-L3: Retrolisthesis.  Uncovering the disc and mild disc bulge.  Mild bilateral facet hypertrophy.  Ligamentum flavum thickening.  Moderate left and mild right narrowing of the lateral recesses.  No significant spinal canal stenosis.  Moderate right and mild left foraminal stenosis.  L3-L4: Trace retrolisthesis.  Mild disc bulge.  Mild-moderate right and mild left facet hypertrophy.  Ligamentum flavum thickening.  Mild narrowing of the bilateral lateral recesses.  No significant spinal canal stenosis.  Mild bilateral foraminal stenosis.  L4-L5: Trace anterolisthesis.  Mild disc bulge.  Moderate right and mild-moderate left facet hypertrophy.  Ligamentum flavum thickening.  Mild narrowing of the bilateral lateral recesses.  No significant spinal canal stenosis.  Mild bilateral foraminal stenosis.  L5-S1: Minimal disc bulge.  Mild-moderate left and mild right facet hypertrophy.  No significant spinal canal stenosis. Mild right greater than left foraminal stenosis.    Assessment:   Problem List Items Addressed This Visit    None  Visit Diagnoses       Lumbar radiculopathy    -  Primary    Spondylolisthesis of lumbar region                  Karla Bonilla is a 68 y.o. female patient who has a past medical history of Colon polyp and Personal history of nicotine dependence. She presents with low back and left leg pain.  She reports having a history of right-sided pain but this is currently not bothering her.  She describes pain in the left low back that  radiates into the left buttock and posterior thigh down to her knee.  This has been present for the past 3 and half months.  She attributes this to an ankle fracture last year, being sedentary and then starting to walk again.  She describes the pain as throbbing, deep, sharp and shooting.  It is worse with sitting, standing, bending, touching, walking, nighttime, morning, extension, flexing, lifting, getting out of a bed or a chair.  The only relief she has found has been with gabapentin 300 mg nightly.  She saw her primary care physician yesterday and was instructed to increase to twice a day.  She has been doing physician directed home exercises for the past several months without any relief and her pain is currently too severe to go to physical therapy.  She denies having weakness or numbness, denies bladder or bowel incontinence.    1/2/24 - Ms. Hector returns the office for follow-up.  Since her last visit she has been participating in physical therapy at [facility]. Between WONG, physical therapy, Lyrica and Cymbalta she feels that her pain is now tolerable.  She notes that once she started Cymbalta specifically her leg pain started to improve.  Her  is present today and also notes that he has seen a difference in her pain.    1. The patient has improved following WONG, PT, Lyrica and Cymbalta.  We discussed options on next steps.  Dr. Traylor has refilled Lyrica 75 mg 3 times daily.  I will have her increase Cymbalta to 40 mg daily if tolerated and increasing to 60 thereafter.  2. She will continue physical therapy at [place] and continue her home exercises.    3. If her pain worsens she will contact us to repeat WONG.    4. Follow-up in 3 months or sooner as needed.    :  Reviewed    This note was completed with dictation software and grammatical errors may exist.

## 2024-03-04 RX ORDER — PREGABALIN 75 MG/1
75 CAPSULE ORAL 3 TIMES DAILY
Qty: 90 CAPSULE | Refills: 2 | Status: SHIPPED | OUTPATIENT
Start: 2024-03-04 | End: 2024-05-08 | Stop reason: SDUPTHER

## 2024-04-04 ENCOUNTER — OFFICE VISIT (OUTPATIENT)
Dept: PAIN MEDICINE | Facility: CLINIC | Age: 69
End: 2024-04-04
Payer: MEDICARE

## 2024-04-04 VITALS — WEIGHT: 148.69 LBS | HEIGHT: 62 IN | BODY MASS INDEX: 27.36 KG/M2

## 2024-04-04 DIAGNOSIS — M43.16 SPONDYLOLISTHESIS OF LUMBAR REGION: ICD-10-CM

## 2024-04-04 DIAGNOSIS — M51.36 DDD (DEGENERATIVE DISC DISEASE), LUMBAR: ICD-10-CM

## 2024-04-04 DIAGNOSIS — M54.16 LUMBAR RADICULOPATHY: Primary | ICD-10-CM

## 2024-04-04 DIAGNOSIS — M47.816 LUMBAR SPONDYLOSIS: ICD-10-CM

## 2024-04-04 PROCEDURE — 3288F FALL RISK ASSESSMENT DOCD: CPT | Mod: CPTII,S$GLB,, | Performed by: PHYSICIAN ASSISTANT

## 2024-04-04 PROCEDURE — 1159F MED LIST DOCD IN RCRD: CPT | Mod: CPTII,S$GLB,, | Performed by: PHYSICIAN ASSISTANT

## 2024-04-04 PROCEDURE — 1160F RVW MEDS BY RX/DR IN RCRD: CPT | Mod: CPTII,S$GLB,, | Performed by: PHYSICIAN ASSISTANT

## 2024-04-04 PROCEDURE — 99999 PR PBB SHADOW E&M-EST. PATIENT-LVL II: CPT | Mod: PBBFAC,,, | Performed by: PHYSICIAN ASSISTANT

## 2024-04-04 PROCEDURE — 99214 OFFICE O/P EST MOD 30 MIN: CPT | Mod: S$GLB,,, | Performed by: PHYSICIAN ASSISTANT

## 2024-04-04 PROCEDURE — 1101F PT FALLS ASSESS-DOCD LE1/YR: CPT | Mod: CPTII,S$GLB,, | Performed by: PHYSICIAN ASSISTANT

## 2024-04-04 PROCEDURE — 3008F BODY MASS INDEX DOCD: CPT | Mod: CPTII,S$GLB,, | Performed by: PHYSICIAN ASSISTANT

## 2024-04-04 PROCEDURE — 1125F AMNT PAIN NOTED PAIN PRSNT: CPT | Mod: CPTII,S$GLB,, | Performed by: PHYSICIAN ASSISTANT

## 2024-04-05 NOTE — PROGRESS NOTES
[facility] Follow Up Evaluation      Referred by: No ref. provider found    PCP: Dr. Adriane Granados    CC:   No chief complaint on file.         4/4/2024     9:22 AM 1/2/2024    10:49 AM 10/23/2023     8:26 AM   Last 3 PDI Scores   Pain Disability Index (PDI) 14 10 30         Interval HPI 4/5/24:  Ms. Hector returns the office for follow-up.  She returns in follow-up today with low back pain that is worse in the mornings and at night.  Since her last visit she completed physical therapy and continues her home exercise program.  She continues to take Lyrica and Cymbalta with relief.  She reports numbness in the right lateral lower leg.  She denies weakness.    HPI:   Karla Bonilla is a 68 y.o. female patient who has a past medical history of Colon polyp and Personal history of nicotine dependence. She presents with low back and left leg pain.  She reports having a history of right-sided pain but this is currently not bothering her.  She describes pain in the left low back that radiates into the left buttock and posterior thigh down to her knee.  This has been present for the past 3 and half months.  She attributes this to an ankle fracture last year, being sedentary and then starting to walk again.  She describes the pain as throbbing, deep, sharp and shooting.  It is worse with sitting, standing, bending, touching, walking, nighttime, morning, extension, flexing, lifting, getting out of a bed or a chair.  The only relief she has found has been with gabapentin 300 mg nightly.  She saw her primary care physician yesterday and was instructed to increase to twice a day.  She has been doing physician directed home exercises for the past several months without any relief and her pain is currently too severe to go to physical therapy.  She denies having weakness or numbness, denies bladder or bowel incontinence.    Pain Intervention History:  - s/p L3-4 WONG on 7/7/23 with 80-85% relief  - She is  status post L4/5 interlaminar epidural steroid injection on 10/04/2023 with 50% relief.    Past Spine Surgical History:      Past and current medications:  Antineuropathics:  Gabapentin 300 mg twice daily  NSAIDs:  Took ibuprofen for several months but developed side effects and discontinued  Physical therapy:  Physician directed home exercise program for about 3 months.  Completed PT and continues home exercise program.  Antidepressants:  Muscle relaxers:  Opioids:  Antiplatelets/Anticoagulants:    History:    Current Outpatient Medications:     DULoxetine (CYMBALTA) 60 MG capsule, Take 1 capsule (60 mg total) by mouth once daily., Disp: 30 capsule, Rfl: 11    pregabalin (LYRICA) 75 MG capsule, Take 1 capsule (75 mg total) by mouth 3 (three) times daily., Disp: 90 capsule, Rfl: 2    ALPRAZolam (XANAX) 0.25 MG tablet, Take 1 tablet (0.25 mg total) by mouth 2 (two) times daily as needed for Anxiety., Disp: 30 tablet, Rfl: 0    Past Medical History:   Diagnosis Date    Colon polyp 2019    Personal history of nicotine dependence        Past Surgical History:   Procedure Laterality Date    CATARACT EXTRACTION, BILATERAL      COLONOSCOPY N/A 11/7/2019    Procedure: COLONOSCOPY;  Surgeon: Pan Duran MD;  Location: [facility];  Service: Endoscopy;  Laterality: N/A;    EPIDURAL STEROID INJECTION INTO LUMBAR SPINE Left 7/7/2023    Procedure: Injection-steroid-epidural-lumbar L3/4;  Surgeon: Brian Traylor MD;  Location: [facility] OR;  Service: Pain Management;  Laterality: Left;    EPIDURAL STEROID INJECTION INTO LUMBAR SPINE N/A 10/4/2023    Procedure: Injection-steroid-epidural-lumbar;  Surgeon: Brian Traylor MD;  Location: [facility] OR;  Service: Pain Management;  Laterality: N/A;  L4/5    TUBAL LIGATION         Family History   Problem Relation Age of Onset    Breast cancer Mother 70    Heart disease Father     Diabetes Brother        Social History     Socioeconomic History    Marital status:    Tobacco Use     Smoking status: Former     Current packs/day: 0.00     Average packs/day: 1 pack/day for 44.0 years (44.0 ttl pk-yrs)     Types: Cigarettes     Start date: 7/21/1976     Quit date: 7/21/2020     Years since quitting: 3.7    Smokeless tobacco: Never   Substance and Sexual Activity    Alcohol use: Yes     Comment: occ glass of wine (1 a month)     Drug use: Never     Social Determinants of Health     Financial Resource Strain: Low Risk  (9/26/2023)    Overall Financial Resource Strain (CARDIA)     Difficulty of Paying Living Expenses: Not hard at all   Food Insecurity: No Food Insecurity (9/26/2023)    Hunger Vital Sign     Worried About Running Out of Food in the Last Year: Never true     Ran Out of Food in the Last Year: Never true   Transportation Needs: No Transportation Needs (9/26/2023)    PRAPARE - Transportation     Lack of Transportation (Medical): No     Lack of Transportation (Non-Medical): No   Physical Activity: Insufficiently Active (9/26/2023)    Exercise Vital Sign     Days of Exercise per Week: 2 days     Minutes of Exercise per Session: 30 min   Stress: No Stress Concern Present (9/26/2023)    [place] [employer] - Occupational Stress Questionnaire     Feeling of Stress : Only a little   Social Connections: Unknown (9/26/2023)    Social Connection and Isolation Panel [NHANES]     Frequency of Communication with Friends and Family: More than three times a week     Frequency of Social Gatherings with Friends and Family: More than three times a week     Active Member of Clubs or Organizations: Yes     Attends Club or Organization Meetings: More than 4 times per year     Marital Status:    Housing Stability: Low Risk  (9/26/2023)    Housing Stability Vital Sign     Unable to Pay for Housing in the Last Year: No     Number of Places Lived in the Last Year: 1     Unstable Housing in the Last Year: No       Review of patient's allergies indicates:  No Known Allergies    Review of  "Systems:  The patient reports stomach pain, back pain, anxiety and loss of balance.  Balance of review of systems is negative.    Physical Exam:  Vitals:    04/04/24 0921   Weight: 67.4 kg (148 lb 11.2 oz)   Height: 5' 2" (1.575 m)   PainSc:   2       Body mass index is 27.2 kg/m².    Gen: NAD  Psych: mood appropriate for given condition  HEENT: eyes anicteric   CV: RRR  HEENT: anicteric   Respiratory: non-labored, no signs of respiratory distress  Abd: non-distended  Skin: warm, dry and intact.  Gait: antalgic    Sensory:  Intact and symmetrical to light touch in L1-S1 dermatomes bilaterally.    Motor:     Right Left   L2/3 Iliacus Hip flexion  5  5   L3/4 Qudratus Femoris Knee Extension  5  5   L4/5 Tib Anterior Ankle Dorsiflexion   5  5   L5/S1 Extensor Hallicus Longus Great toe extension  5  5   S1/S2 Gastroc/Soleus Plantar Flexion  5  5      Right Left                  Patellar DTR 2+ 2+   Achilles DTR 0+ 0+                      Labs:  Lab Results   Component Value Date    HGBA1C 5.4 10/04/2023       Lab Results   Component Value Date    WBC 5.65 10/04/2023    HGB 13.5 10/04/2023    HCT 40.9 10/04/2023    MCV 95 10/04/2023     10/04/2023       Imaging:  MRI lumbar spine 06/16/2023  NOMENCLATURE: Five lumbar type vertebral bodies.     CORD/CAUDA EQUINA: Conus has normal size and signal and ends at a normal level of L1-L2.     ALIGNMENT: Trace retrolisthesis of L1 on L2.  4 mm retrolisthesis of L2 on L3.  Trace retrolisthesis of L3 on L4.  Trace grade 1 anterolisthesis of L4 on L5.     BONES: Vertebral body heights are maintained.  Type 1 endplate changes on the left at L1-2, L2-3 and to lesser extent at L3-4 and L4-5.  Several tiny Schmorl's nodes.  No aggressive bone marrow signal.     PARASPINAL AREA: Tiny possible synovial cyst in the left dorsal paraspinal soft tissues near the tip of the left L5-S1 facet (series 7, image 14).     LUMBAR DISC LEVELS:     T12-L1: No disc herniation or significant " posterior osteophytic ridging.  No significant spinal canal or foraminal stenosis.  L1-L2: Trace retrolisthesis.  Mild disc bulge.  Disc height loss.  Mild bilateral facet hypertrophy.  Minimal narrowing of the bilateral lateral recesses.  No significant spinal canal stenosis.  Mild left foraminal stenosis.  No significant spinal canal or foraminal stenosis.  L2-L3: Retrolisthesis.  Uncovering the disc and mild disc bulge.  Mild bilateral facet hypertrophy.  Ligamentum flavum thickening.  Moderate left and mild right narrowing of the lateral recesses.  No significant spinal canal stenosis.  Moderate right and mild left foraminal stenosis.  L3-L4: Trace retrolisthesis.  Mild disc bulge.  Mild-moderate right and mild left facet hypertrophy.  Ligamentum flavum thickening.  Mild narrowing of the bilateral lateral recesses.  No significant spinal canal stenosis.  Mild bilateral foraminal stenosis.  L4-L5: Trace anterolisthesis.  Mild disc bulge.  Moderate right and mild-moderate left facet hypertrophy.  Ligamentum flavum thickening.  Mild narrowing of the bilateral lateral recesses.  No significant spinal canal stenosis.  Mild bilateral foraminal stenosis.  L5-S1: Minimal disc bulge.  Mild-moderate left and mild right facet hypertrophy.  No significant spinal canal stenosis. Mild right greater than left foraminal stenosis.    Assessment:   Problem List Items Addressed This Visit    None  Visit Diagnoses       Lumbar radiculopathy    -  Primary    Spondylolisthesis of lumbar region        Lumbar spondylosis        DDD (degenerative disc disease), lumbar                  Karla Bonilla is a 68 y.o. female patient who has a past medical history of Colon polyp and Personal history of nicotine dependence. She presents with low back and left leg pain.  She reports having a history of right-sided pain but this is currently not bothering her.  She describes pain in the left low back that radiates into the left buttock  and posterior thigh down to her knee.  This has been present for the past 3 and half months.  She attributes this to an ankle fracture last year, being sedentary and then starting to walk again.  She describes the pain as throbbing, deep, sharp and shooting.  It is worse with sitting, standing, bending, touching, walking, nighttime, morning, extension, flexing, lifting, getting out of a bed or a chair.  The only relief she has found has been with gabapentin 300 mg nightly.  She saw her primary care physician yesterday and was instructed to increase to twice a day.  She has been doing physician directed home exercises for the past several months without any relief and her pain is currently too severe to go to physical therapy.  She denies having weakness or numbness, denies bladder or bowel incontinence.    1/2/24 - Ms. Hector returns the office for follow-up.  She returns in follow-up today with low back pain that is worse in the mornings and at night.  Since her last visit she completed physical therapy and continues her home exercise program.  She continues to take Lyrica and Cymbalta with relief.  She reports numbness in the right lateral lower leg.  She denies weakness.    1. The patient completed physical therapy and continues her home exercise program.    2. She will continue Cymbalta and Dr. Traylor has provided refills for Lyrica 75 mg 3 times a day. I have reviewed the [place] Board of Pharmacy website and there are no abberancies.    3. She can contact us to repeat a lumbar epidural steroid injection in the future if necessary.  4. Follow-up in 3 months or sooner as needed.      :  Reviewed    This note was completed with dictation software and grammatical errors may exist.

## 2024-05-08 RX ORDER — PREGABALIN 75 MG/1
75 CAPSULE ORAL 3 TIMES DAILY
Qty: 90 CAPSULE | Refills: 2 | Status: SHIPPED | OUTPATIENT
Start: 2024-05-08 | End: 2024-08-06

## 2024-07-08 ENCOUNTER — TELEPHONE (OUTPATIENT)
Dept: PAIN MEDICINE | Facility: CLINIC | Age: 69
End: 2024-07-08

## 2024-07-08 ENCOUNTER — OFFICE VISIT (OUTPATIENT)
Dept: PAIN MEDICINE | Facility: CLINIC | Age: 69
End: 2024-07-08
Payer: MEDICARE

## 2024-07-08 VITALS
BODY MASS INDEX: 2.8 KG/M2 | SYSTOLIC BLOOD PRESSURE: 121 MMHG | HEART RATE: 57 BPM | HEIGHT: 62 IN | WEIGHT: 15.19 LBS | DIASTOLIC BLOOD PRESSURE: 66 MMHG

## 2024-07-08 DIAGNOSIS — M54.9 DORSALGIA: ICD-10-CM

## 2024-07-08 DIAGNOSIS — M47.816 LUMBAR SPONDYLOSIS: Primary | ICD-10-CM

## 2024-07-08 PROCEDURE — 3074F SYST BP LT 130 MM HG: CPT | Mod: CPTII,S$GLB,, | Performed by: ANESTHESIOLOGY

## 2024-07-08 PROCEDURE — 1159F MED LIST DOCD IN RCRD: CPT | Mod: CPTII,S$GLB,, | Performed by: ANESTHESIOLOGY

## 2024-07-08 PROCEDURE — 99214 OFFICE O/P EST MOD 30 MIN: CPT | Mod: S$GLB,,, | Performed by: ANESTHESIOLOGY

## 2024-07-08 PROCEDURE — 3078F DIAST BP <80 MM HG: CPT | Mod: CPTII,S$GLB,, | Performed by: ANESTHESIOLOGY

## 2024-07-08 PROCEDURE — 3288F FALL RISK ASSESSMENT DOCD: CPT | Mod: CPTII,S$GLB,, | Performed by: ANESTHESIOLOGY

## 2024-07-08 PROCEDURE — 1101F PT FALLS ASSESS-DOCD LE1/YR: CPT | Mod: CPTII,S$GLB,, | Performed by: ANESTHESIOLOGY

## 2024-07-08 PROCEDURE — 1125F AMNT PAIN NOTED PAIN PRSNT: CPT | Mod: CPTII,S$GLB,, | Performed by: ANESTHESIOLOGY

## 2024-07-08 PROCEDURE — 3008F BODY MASS INDEX DOCD: CPT | Mod: CPTII,S$GLB,, | Performed by: ANESTHESIOLOGY

## 2024-07-08 PROCEDURE — 1160F RVW MEDS BY RX/DR IN RCRD: CPT | Mod: CPTII,S$GLB,, | Performed by: ANESTHESIOLOGY

## 2024-07-08 PROCEDURE — 99999 PR PBB SHADOW E&M-EST. PATIENT-LVL III: CPT | Mod: PBBFAC,,, | Performed by: ANESTHESIOLOGY

## 2024-07-08 RX ORDER — ALPRAZOLAM 1 MG/1
1 TABLET, ORALLY DISINTEGRATING ORAL ONCE AS NEEDED
OUTPATIENT
Start: 2024-07-08 | End: 2035-12-05

## 2024-07-08 NOTE — TELEPHONE ENCOUNTER
Physician - Dr Traylor    Type of Procedure/Injection - Lumbar Medial Branch Block  L4/5 and L5/S1           Laterality - Bilateral      Anxiolysis- Local      Need to hold medication - No      Clearance needed - No      Follow up - phone call next day

## 2024-07-08 NOTE — PROGRESS NOTES
[facility] Follow Up Evaluation      Referred by: No ref. provider found    PCP: Dr. Adriane Granados    CC:   Chief Complaint   Patient presents with    Back Pain     3 follow up          4/4/2024     9:22 AM 1/2/2024    10:49 AM 10/23/2023     8:26 AM   Last 3 PDI Scores   Pain Disability Index (PDI) 14 10 30         Interval HPI 7/8/24:  Ms. Hector returns the office for follow-up.  Reports primarily axial lower back pain.  Pain can get to 6/10 at times.  She has not having any obvious radicular symptoms.  Her pain is worse with going from sitting to standing and with twisting.    HPI:   Karla Bonilla is a 68 y.o. female patient who has a past medical history of Colon polyp and Personal history of nicotine dependence. She presents with low back and left leg pain.  She reports having a history of right-sided pain but this is currently not bothering her.  She describes pain in the left low back that radiates into the left buttock and posterior thigh down to her knee.  This has been present for the past 3 and half months.  She attributes this to an ankle fracture last year, being sedentary and then starting to walk again.  She describes the pain as throbbing, deep, sharp and shooting.  It is worse with sitting, standing, bending, touching, walking, nighttime, morning, extension, flexing, lifting, getting out of a bed or a chair.  The only relief she has found has been with gabapentin 300 mg nightly.  She saw her primary care physician yesterday and was instructed to increase to twice a day.  She has been doing physician directed home exercises for the past several months without any relief and her pain is currently too severe to go to physical therapy.  She denies having weakness or numbness, denies bladder or bowel incontinence.    Pain Intervention History:  - s/p L3-4 WONG on 7/7/23 with 80-85% relief  - She is status post L4/5 interlaminar epidural steroid injection on 10/04/2023 with  50% relief.    Past Spine Surgical History:      Past and current medications:  Antineuropathics:  Gabapentin 300 mg twice daily  NSAIDs:  Took ibuprofen for several months but developed side effects and discontinued  Physical therapy:  Physician directed home exercise program for about 3 months.  Completed PT and continues home exercise program.  Antidepressants:  Muscle relaxers:  Opioids:  Antiplatelets/Anticoagulants:    History:    Current Outpatient Medications:     ALPRAZolam (XANAX) 0.25 MG tablet, Take 1 tablet (0.25 mg total) by mouth 2 (two) times daily as needed for Anxiety., Disp: 30 tablet, Rfl: 0    DULoxetine (CYMBALTA) 60 MG capsule, Take 1 capsule (60 mg total) by mouth once daily., Disp: 30 capsule, Rfl: 11    pregabalin (LYRICA) 75 MG capsule, Take 1 capsule (75 mg total) by mouth 3 (three) times daily., Disp: 90 capsule, Rfl: 2    Past Medical History:   Diagnosis Date    Colon polyp 2019    Personal history of nicotine dependence        Past Surgical History:   Procedure Laterality Date    CATARACT EXTRACTION, BILATERAL      COLONOSCOPY N/A 11/7/2019    Procedure: COLONOSCOPY;  Surgeon: Pan Duran MD;  Location: [facility];  Service: Endoscopy;  Laterality: N/A;    EPIDURAL STEROID INJECTION INTO LUMBAR SPINE Left 7/7/2023    Procedure: Injection-steroid-epidural-lumbar L3/4;  Surgeon: Brian Traylor MD;  Location: [facility] OR;  Service: Pain Management;  Laterality: Left;    EPIDURAL STEROID INJECTION INTO LUMBAR SPINE N/A 10/4/2023    Procedure: Injection-steroid-epidural-lumbar;  Surgeon: Brian Traylor MD;  Location: [facility] OR;  Service: Pain Management;  Laterality: N/A;  L4/5    TUBAL LIGATION         Family History   Problem Relation Name Age of Onset    Breast cancer Mother  70    Heart disease Father      Diabetes Brother         Social History     Socioeconomic History    Marital status:    Tobacco Use    Smoking status: Former     Current packs/day: 0.00     Average  "packs/day: 1 pack/day for 44.0 years (44.0 ttl pk-yrs)     Types: Cigarettes     Start date: 7/21/1976     Quit date: 7/21/2020     Years since quitting: 3.9    Smokeless tobacco: Never   Substance and Sexual Activity    Alcohol use: Yes     Comment: occ glass of wine (1 a month)     Drug use: Never     Social Determinants of Health     Financial Resource Strain: Low Risk  (9/26/2023)    Overall Financial Resource Strain (CARDIA)     Difficulty of Paying Living Expenses: Not hard at all   Food Insecurity: No Food Insecurity (9/26/2023)    Hunger Vital Sign     Worried About Running Out of Food in the Last Year: Never true     Ran Out of Food in the Last Year: Never true   Transportation Needs: No Transportation Needs (9/26/2023)    PRAPARE - Transportation     Lack of Transportation (Medical): No     Lack of Transportation (Non-Medical): No   Physical Activity: Insufficiently Active (9/26/2023)    Exercise Vital Sign     Days of Exercise per Week: 2 days     Minutes of Exercise per Session: 30 min   Stress: No Stress Concern Present (9/26/2023)    [employer] - Occupational Stress Questionnaire     Feeling of Stress : Only a little   Housing Stability: Low Risk  (9/26/2023)    Housing Stability Vital Sign     Unable to Pay for Housing in the Last Year: No     Number of Places Lived in the Last Year: 1     Unstable Housing in the Last Year: No       Review of patient's allergies indicates:  No Known Allergies    Review of Systems:  The patient reports stomach pain, back pain, anxiety and loss of balance.  Balance of review of systems is negative.    Physical Exam:  Vitals:    07/08/24 0910   BP: 121/66   Pulse: (!) 57   Weight: 6.9 kg (15 lb 3.4 oz)   Height: 5' 2" (1.575 m)   PainSc:   2   PainLoc: Back       Body mass index is 2.78 kg/m².    Gen: NAD  Psych: mood appropriate for given condition  HEENT: eyes anicteric   CV: RRR  HEENT: anicteric   Respiratory: non-labored, no signs of " respiratory distress  Abd: non-distended  Skin: warm, dry and intact.  Gait: antalgic    Reproducible pain with lumbar axial facet loading    Sensory:  Intact and symmetrical to light touch in L1-S1 dermatomes bilaterally.    Motor:     Right Left   L2/3 Iliacus Hip flexion  5  5   L3/4 Qudratus Femoris Knee Extension  5  5   L4/5 Tib Anterior Ankle Dorsiflexion   5  5   L5/S1 Extensor Hallicus Longus Great toe extension  5  5   S1/S2 Gastroc/Soleus Plantar Flexion  5  5      Right Left                  Patellar DTR 2+ 2+   Achilles DTR 0+ 0+                      Labs:  Lab Results   Component Value Date    HGBA1C 5.4 10/04/2023       Lab Results   Component Value Date    WBC 5.65 10/04/2023    HGB 13.5 10/04/2023    HCT 40.9 10/04/2023    MCV 95 10/04/2023     10/04/2023       Imaging:  MRI lumbar spine 06/16/2023  NOMENCLATURE: Five lumbar type vertebral bodies.     CORD/CAUDA EQUINA: Conus has normal size and signal and ends at a normal level of L1-L2.     ALIGNMENT: Trace retrolisthesis of L1 on L2.  4 mm retrolisthesis of L2 on L3.  Trace retrolisthesis of L3 on L4.  Trace grade 1 anterolisthesis of L4 on L5.     BONES: Vertebral body heights are maintained.  Type 1 endplate changes on the left at L1-2, L2-3 and to lesser extent at L3-4 and L4-5.  Several tiny Schmorl's nodes.  No aggressive bone marrow signal.     PARASPINAL AREA: Tiny possible synovial cyst in the left dorsal paraspinal soft tissues near the tip of the left L5-S1 facet (series 7, image 14).     LUMBAR DISC LEVELS:     T12-L1: No disc herniation or significant posterior osteophytic ridging.  No significant spinal canal or foraminal stenosis.  L1-L2: Trace retrolisthesis.  Mild disc bulge.  Disc height loss.  Mild bilateral facet hypertrophy.  Minimal narrowing of the bilateral lateral recesses.  No significant spinal canal stenosis.  Mild left foraminal stenosis.  No significant spinal canal or foraminal stenosis.  L2-L3:  Retrolisthesis.  Uncovering the disc and mild disc bulge.  Mild bilateral facet hypertrophy.  Ligamentum flavum thickening.  Moderate left and mild right narrowing of the lateral recesses.  No significant spinal canal stenosis.  Moderate right and mild left foraminal stenosis.  L3-L4: Trace retrolisthesis.  Mild disc bulge.  Mild-moderate right and mild left facet hypertrophy.  Ligamentum flavum thickening.  Mild narrowing of the bilateral lateral recesses.  No significant spinal canal stenosis.  Mild bilateral foraminal stenosis.  L4-L5: Trace anterolisthesis.  Mild disc bulge.  Moderate right and mild-moderate left facet hypertrophy.  Ligamentum flavum thickening.  Mild narrowing of the bilateral lateral recesses.  No significant spinal canal stenosis.  Mild bilateral foraminal stenosis.  L5-S1: Minimal disc bulge.  Mild-moderate left and mild right facet hypertrophy.  No significant spinal canal stenosis. Mild right greater than left foraminal stenosis.    Assessment:   Problem List Items Addressed This Visit    None  Visit Diagnoses       Lumbar spondylosis    -  Primary    Dorsalgia                  Karla Hector Irene is a 68 y.o. female patient who has a past medical history of Colon polyp and Personal history of nicotine dependence. She presents with low back     7/8/24 - Ms. Hector returns the office for follow-up.  Reports primarily axial lower back pain.  Pain can get to 6/10 at times.  She has not having any obvious radicular symptoms.  Her pain is worse with going from sitting to standing and with twisting.    - on exam she has reproducible pain with lumbar axial facet loading  - I independently reviewed her lumbar MRI and she has multilevel bilateral facet arthropathy  - over the past 3 months she has participate in formal physical therapy and maintains PT directed home exercise program as well as continues to take Lyrica and Cymbalta however continues to have back pain that is limiting her  mobility and interfering with the quality of her life  - I think her back pain is secondary to lumbar spondylosis.  She also has Modic changes in her lower back  - we will schedule 1st diagnostic bilateral L4-5 and L5-S1 medial branch blocks.  The risks and benefits of this intervention, and alternative therapies were discussed with the patient.  The discussion of risks included infection, bleeding, need for additional procedures or surgery, nerve damage.  Questions regarding the procedure, risks, expected outcome, and possible side effects were solicited and answered to the patient's satisfaction.  Karla Hector Irene wishes to proceed with the injection or procedure.  Written consent was obtained.  - if she fails to find significant improvement she may be a candidate for basivertebral nerve ablation in the future      :  Reviewed    This note was completed with dictation software and grammatical errors may exist.

## 2024-07-08 NOTE — H&P (VIEW-ONLY)
[facility] Follow Up Evaluation      Referred by: No ref. provider found    PCP: Dr. Adriane Granados    CC:   Chief Complaint   Patient presents with    Back Pain     3 follow up          4/4/2024     9:22 AM 1/2/2024    10:49 AM 10/23/2023     8:26 AM   Last 3 PDI Scores   Pain Disability Index (PDI) 14 10 30         Interval HPI 7/8/24:  Ms. Hector returns the office for follow-up.  Reports primarily axial lower back pain.  Pain can get to 6/10 at times.  She has not having any obvious radicular symptoms.  Her pain is worse with going from sitting to standing and with twisting.    HPI:   Karla Bonilla is a 68 y.o. female patient who has a past medical history of Colon polyp and Personal history of nicotine dependence. She presents with low back and left leg pain.  She reports having a history of right-sided pain but this is currently not bothering her.  She describes pain in the left low back that radiates into the left buttock and posterior thigh down to her knee.  This has been present for the past 3 and half months.  She attributes this to an ankle fracture last year, being sedentary and then starting to walk again.  She describes the pain as throbbing, deep, sharp and shooting.  It is worse with sitting, standing, bending, touching, walking, nighttime, morning, extension, flexing, lifting, getting out of a bed or a chair.  The only relief she has found has been with gabapentin 300 mg nightly.  She saw her primary care physician yesterday and was instructed to increase to twice a day.  She has been doing physician directed home exercises for the past several months without any relief and her pain is currently too severe to go to physical therapy.  She denies having weakness or numbness, denies bladder or bowel incontinence.    Pain Intervention History:  - s/p L3-4 WONG on 7/7/23 with 80-85% relief  - She is status post L4/5 interlaminar epidural steroid injection on 10/04/2023 with  50% relief.    Past Spine Surgical History:      Past and current medications:  Antineuropathics:  Gabapentin 300 mg twice daily  NSAIDs:  Took ibuprofen for several months but developed side effects and discontinued  Physical therapy:  Physician directed home exercise program for about 3 months.  Completed PT and continues home exercise program.  Antidepressants:  Muscle relaxers:  Opioids:  Antiplatelets/Anticoagulants:    History:    Current Outpatient Medications:     ALPRAZolam (XANAX) 0.25 MG tablet, Take 1 tablet (0.25 mg total) by mouth 2 (two) times daily as needed for Anxiety., Disp: 30 tablet, Rfl: 0    DULoxetine (CYMBALTA) 60 MG capsule, Take 1 capsule (60 mg total) by mouth once daily., Disp: 30 capsule, Rfl: 11    pregabalin (LYRICA) 75 MG capsule, Take 1 capsule (75 mg total) by mouth 3 (three) times daily., Disp: 90 capsule, Rfl: 2    Past Medical History:   Diagnosis Date    Colon polyp 2019    Personal history of nicotine dependence        Past Surgical History:   Procedure Laterality Date    CATARACT EXTRACTION, BILATERAL      COLONOSCOPY N/A 11/7/2019    Procedure: COLONOSCOPY;  Surgeon: Pan Duran MD;  Location: [facility];  Service: Endoscopy;  Laterality: N/A;    EPIDURAL STEROID INJECTION INTO LUMBAR SPINE Left 7/7/2023    Procedure: Injection-steroid-epidural-lumbar L3/4;  Surgeon: Brian Traylor MD;  Location: [facility] OR;  Service: Pain Management;  Laterality: Left;    EPIDURAL STEROID INJECTION INTO LUMBAR SPINE N/A 10/4/2023    Procedure: Injection-steroid-epidural-lumbar;  Surgeon: Brian Traylor MD;  Location: [facility] OR;  Service: Pain Management;  Laterality: N/A;  L4/5    TUBAL LIGATION         Family History   Problem Relation Name Age of Onset    Breast cancer Mother  70    Heart disease Father      Diabetes Brother         Social History     Socioeconomic History    Marital status:    Tobacco Use    Smoking status: Former     Current packs/day: 0.00     Average  "packs/day: 1 pack/day for 44.0 years (44.0 ttl pk-yrs)     Types: Cigarettes     Start date: 7/21/1976     Quit date: 7/21/2020     Years since quitting: 3.9    Smokeless tobacco: Never   Substance and Sexual Activity    Alcohol use: Yes     Comment: occ glass of wine (1 a month)     Drug use: Never     Social Determinants of Health     Financial Resource Strain: Low Risk  (9/26/2023)    Overall Financial Resource Strain (CARDIA)     Difficulty of Paying Living Expenses: Not hard at all   Food Insecurity: No Food Insecurity (9/26/2023)    Hunger Vital Sign     Worried About Running Out of Food in the Last Year: Never true     Ran Out of Food in the Last Year: Never true   Transportation Needs: No Transportation Needs (9/26/2023)    PRAPARE - Transportation     Lack of Transportation (Medical): No     Lack of Transportation (Non-Medical): No   Physical Activity: Insufficiently Active (9/26/2023)    Exercise Vital Sign     Days of Exercise per Week: 2 days     Minutes of Exercise per Session: 30 min   Stress: No Stress Concern Present (9/26/2023)    [employer] - Occupational Stress Questionnaire     Feeling of Stress : Only a little   Housing Stability: Low Risk  (9/26/2023)    Housing Stability Vital Sign     Unable to Pay for Housing in the Last Year: No     Number of Places Lived in the Last Year: 1     Unstable Housing in the Last Year: No       Review of patient's allergies indicates:  No Known Allergies    Review of Systems:  The patient reports stomach pain, back pain, anxiety and loss of balance.  Balance of review of systems is negative.    Physical Exam:  Vitals:    07/08/24 0910   BP: 121/66   Pulse: (!) 57   Weight: 6.9 kg (15 lb 3.4 oz)   Height: 5' 2" (1.575 m)   PainSc:   2   PainLoc: Back       Body mass index is 2.78 kg/m².    Gen: NAD  Psych: mood appropriate for given condition  HEENT: eyes anicteric   CV: RRR  HEENT: anicteric   Respiratory: non-labored, no signs of " respiratory distress  Abd: non-distended  Skin: warm, dry and intact.  Gait: antalgic    Reproducible pain with lumbar axial facet loading    Sensory:  Intact and symmetrical to light touch in L1-S1 dermatomes bilaterally.    Motor:     Right Left   L2/3 Iliacus Hip flexion  5  5   L3/4 Qudratus Femoris Knee Extension  5  5   L4/5 Tib Anterior Ankle Dorsiflexion   5  5   L5/S1 Extensor Hallicus Longus Great toe extension  5  5   S1/S2 Gastroc/Soleus Plantar Flexion  5  5      Right Left                  Patellar DTR 2+ 2+   Achilles DTR 0+ 0+                      Labs:  Lab Results   Component Value Date    HGBA1C 5.4 10/04/2023       Lab Results   Component Value Date    WBC 5.65 10/04/2023    HGB 13.5 10/04/2023    HCT 40.9 10/04/2023    MCV 95 10/04/2023     10/04/2023       Imaging:  MRI lumbar spine 06/16/2023  NOMENCLATURE: Five lumbar type vertebral bodies.     CORD/CAUDA EQUINA: Conus has normal size and signal and ends at a normal level of L1-L2.     ALIGNMENT: Trace retrolisthesis of L1 on L2.  4 mm retrolisthesis of L2 on L3.  Trace retrolisthesis of L3 on L4.  Trace grade 1 anterolisthesis of L4 on L5.     BONES: Vertebral body heights are maintained.  Type 1 endplate changes on the left at L1-2, L2-3 and to lesser extent at L3-4 and L4-5.  Several tiny Schmorl's nodes.  No aggressive bone marrow signal.     PARASPINAL AREA: Tiny possible synovial cyst in the left dorsal paraspinal soft tissues near the tip of the left L5-S1 facet (series 7, image 14).     LUMBAR DISC LEVELS:     T12-L1: No disc herniation or significant posterior osteophytic ridging.  No significant spinal canal or foraminal stenosis.  L1-L2: Trace retrolisthesis.  Mild disc bulge.  Disc height loss.  Mild bilateral facet hypertrophy.  Minimal narrowing of the bilateral lateral recesses.  No significant spinal canal stenosis.  Mild left foraminal stenosis.  No significant spinal canal or foraminal stenosis.  L2-L3:  Retrolisthesis.  Uncovering the disc and mild disc bulge.  Mild bilateral facet hypertrophy.  Ligamentum flavum thickening.  Moderate left and mild right narrowing of the lateral recesses.  No significant spinal canal stenosis.  Moderate right and mild left foraminal stenosis.  L3-L4: Trace retrolisthesis.  Mild disc bulge.  Mild-moderate right and mild left facet hypertrophy.  Ligamentum flavum thickening.  Mild narrowing of the bilateral lateral recesses.  No significant spinal canal stenosis.  Mild bilateral foraminal stenosis.  L4-L5: Trace anterolisthesis.  Mild disc bulge.  Moderate right and mild-moderate left facet hypertrophy.  Ligamentum flavum thickening.  Mild narrowing of the bilateral lateral recesses.  No significant spinal canal stenosis.  Mild bilateral foraminal stenosis.  L5-S1: Minimal disc bulge.  Mild-moderate left and mild right facet hypertrophy.  No significant spinal canal stenosis. Mild right greater than left foraminal stenosis.    Assessment:   Problem List Items Addressed This Visit    None  Visit Diagnoses       Lumbar spondylosis    -  Primary    Dorsalgia                  Karla Hector Irene is a 68 y.o. female patient who has a past medical history of Colon polyp and Personal history of nicotine dependence. She presents with low back     7/8/24 - Ms. Hector returns the office for follow-up.  Reports primarily axial lower back pain.  Pain can get to 6/10 at times.  She has not having any obvious radicular symptoms.  Her pain is worse with going from sitting to standing and with twisting.    - on exam she has reproducible pain with lumbar axial facet loading  - I independently reviewed her lumbar MRI and she has multilevel bilateral facet arthropathy  - over the past 3 months she has participate in formal physical therapy and maintains PT directed home exercise program as well as continues to take Lyrica and Cymbalta however continues to have back pain that is limiting her  mobility and interfering with the quality of her life  - I think her back pain is secondary to lumbar spondylosis.  She also has Modic changes in her lower back  - we will schedule 1st diagnostic bilateral L4-5 and L5-S1 medial branch blocks.  The risks and benefits of this intervention, and alternative therapies were discussed with the patient.  The discussion of risks included infection, bleeding, need for additional procedures or surgery, nerve damage.  Questions regarding the procedure, risks, expected outcome, and possible side effects were solicited and answered to the patient's satisfaction.  Karla Hector Irene wishes to proceed with the injection or procedure.  Written consent was obtained.  - if she fails to find significant improvement she may be a candidate for basivertebral nerve ablation in the future      :  Reviewed    This note was completed with dictation software and grammatical errors may exist.

## 2024-07-24 RX ORDER — MULTIVITAMIN
1 TABLET ORAL DAILY
COMMUNITY

## 2024-07-26 ENCOUNTER — HOSPITAL ENCOUNTER (OUTPATIENT)
Facility: HOSPITAL | Age: 69
Discharge: HOME | End: 2024-07-26
Attending: ANESTHESIOLOGY | Admitting: ANESTHESIOLOGY
Payer: MEDICARE

## 2024-07-26 ENCOUNTER — HOSPITAL ENCOUNTER (OUTPATIENT)
Dept: RADIOLOGY | Facility: HOSPITAL | Age: 69
Discharge: HOME | End: 2024-07-26
Attending: ANESTHESIOLOGY | Admitting: ANESTHESIOLOGY
Payer: MEDICARE

## 2024-07-26 VITALS
DIASTOLIC BLOOD PRESSURE: 55 MMHG | RESPIRATION RATE: 18 BRPM | BODY MASS INDEX: 26.87 KG/M2 | WEIGHT: 146 LBS | OXYGEN SATURATION: 96 % | HEIGHT: 62 IN | SYSTOLIC BLOOD PRESSURE: 101 MMHG | HEART RATE: 52 BPM | TEMPERATURE: 97 F

## 2024-07-26 DIAGNOSIS — M47.816 LUMBAR SPONDYLOSIS: Primary | ICD-10-CM

## 2024-07-26 DIAGNOSIS — M54.9 BACK PAIN: ICD-10-CM

## 2024-07-26 PROCEDURE — 76000 FLUOROSCOPY <1 HR PHYS/QHP: CPT | Mod: TC,PO

## 2024-07-26 PROCEDURE — 25000003 PHARM REV CODE 250: Mod: PO | Performed by: ANESTHESIOLOGY

## 2024-07-26 PROCEDURE — 63600175 PHARM REV CODE 636 W HCPCS: Mod: JZ,JG,PO | Performed by: ANESTHESIOLOGY

## 2024-07-26 RX ORDER — LIDOCAINE HYDROCHLORIDE 10 MG/ML
INJECTION, SOLUTION EPIDURAL; INFILTRATION; INTRACAUDAL; PERINEURAL
Status: DISCONTINUED | OUTPATIENT
Start: 2024-07-26 | End: 2024-07-26 | Stop reason: HOSPADM

## 2024-07-26 RX ORDER — ALPRAZOLAM 0.5 MG/1
1 TABLET, ORALLY DISINTEGRATING ORAL ONCE AS NEEDED
Status: COMPLETED | OUTPATIENT
Start: 2024-07-26 | End: 2024-07-26

## 2024-07-26 RX ORDER — BUPIVACAINE HYDROCHLORIDE 2.5 MG/ML
INJECTION, SOLUTION EPIDURAL; INFILTRATION; INTRACAUDAL
Status: DISCONTINUED | OUTPATIENT
Start: 2024-07-26 | End: 2024-07-26 | Stop reason: HOSPADM

## 2024-07-26 RX ADMIN — ALPRAZOLAM 1 MG: 0.5 TABLET, ORALLY DISINTEGRATING ORAL at 11:07

## 2024-07-26 NOTE — OP NOTE
Procedure Note    Procedure Date: 7/26/2024    Procedure Performed:  bilateral Diagnostic Lumbar Medial Branch @ L4/5 and L5/S1, with Fluoroscopic Guidance    Indications:   Karla Bonilla has chronic, moderate to severe axial lower back pain present for over the past 3 months that has failed to respond to physical therapy and PT-directed home exercises. There is no untreated radiculopathy or claudication present.  The patient has clinical and radiologic findings suggestive of facet mediated pain.     Pre-op diagnosis: Lumbar Spondylosis    Post-op diagnosis: same    Physician: Brian Traylor MD    IV Anxiolysis medications: none    Medications injected: Bupivacaine 0.25%, 0.5 cc per level    Local anesthetic used: 1% Lidocaine, 4 ml    Estimated Blood Loss: none    Complications:  none    Technique:  The patient was interviewed in the holding area and Risks/Benefits were discussed, including, but not limited to, the possibility of new or different pain, bleeding or infection.   All questions were answered.  The patient understood and accepted risks.  Consent was reviewed.  A time out was taken to identify the patient, procedure and side of the procedure. The patient was placed in a prone position, then prepped and draped in the usual sterile fashion using ChloraPrep x2 and sterile towels.  The levels were determined under fluoroscopic guidance and then marked.  1% Lidocaine was given by raising a wheal at the skin over each site and then infiltrated approximately 2cm deeper.  A 25-gauge 3.5 inch needle was introduced to the anatomic location of the L3-5 medial branch nerves on the bilateral side.  Then, after negative aspiration, 0.5 cc of Bupivacaine 0.25% was injected @ each level.  The patient tolerated the procedure well.  The patient tolerated the procedure well and was transferred to the [facility] in stable condition.  The patient was monitored after the procedure.  Patient was given post procedure and  discharge instructions to follow at home.  The patient was discharged in a stable condition.    Event Time In   In Facility 1031   In Pre-Procedure 1104   Physician Available    Anesthesia Available    Pre-Op: Bedside Procedure Start    Pre-Op: Bedside Procedure Stop    Pre-Procedure Complete 1127   Out of Pre-Procedure    Anesthesia Start    Anesthesia Start Data Collection    Setup Start    Setup Complete    In Room 1143   Prep Start    Procedure Prep Complete    MD notified pt. ready    Procedure Start 1148   Procedure Closing    Emergence    Procedure Finish 1152   Sedation Start    Scope In    Extent Reached    Scope Out    Sedation End    Out of Room 1155   Cleanup Start    Cleanup Complete    Cosmetic Start    Cosmetic Stop    Pain Mgmt In Room    Pain Mgmt Out Room    In Recovery    Anesthesia Finish    Bedside Procedure Start    Bedside Procedure Stop    Recovery Care Complete    Out of Recovery    To Phase II    In Phase II    Pain Mgmt Recovery Start    Pain Mgmt Recovery Stop    Obs Rec Start    Obs Rec Stop    Phase II Care Complete    Out of Phase II    Procedural Care Complete    Discharge    Pain Follow Up Needed    Pain Follow Up Complete

## 2024-07-26 NOTE — DISCHARGE SUMMARY
[facility]  Discharge Note  Short Stay    Admit Date: 7/26/2024    Discharge Date: 7/26/2024    Attending Physician: Brian Traylor     Discharge Provider: Brian Traylor    Diagnoses:  There are no hospital problems to display for this patient.      Discharged Condition: Good    Final Diagnoses: Lumbar spondylosis [M47.816]    Disposition: Home or Self Care    Hospital Course: No complications, uneventful    Outcome of Hospitalization, Treatment, Procedure, or Surgery:  Patient was admitted for outpatient interventional pain management procedure. The patient tolerated the procedure well with no complications.    Follow up/Patient Instructions:  Follow up as scheduled in Pain Management office in 2-3 weeks.  Patient has received instructions and follow up date and time.    Medications:  Continue previous medications    Discharge Procedure Orders   Notify your health care provider if you experience any of the following:  temperature >100.4     Notify your health care provider if you experience any of the following:  persistent nausea and vomiting or diarrhea     Notify your health care provider if you experience any of the following:  severe uncontrolled pain     Notify your health care provider if you experience any of the following:  redness, tenderness, or signs of infection (pain, swelling, redness, odor or green/yellow discharge around incision site)     Notify your health care provider if you experience any of the following:  difficulty breathing or increased cough     Notify your health care provider if you experience any of the following:  severe persistent headache     Notify your health care provider if you experience any of the following:  worsening rash     Notify your health care provider if you experience any of the following:  persistent dizziness, light-headedness, or visual disturbances     Notify your health care provider if you experience any of the following:  increased confusion or  weakness     Activity as tolerated

## 2024-07-31 ENCOUNTER — PATIENT MESSAGE (OUTPATIENT)
Dept: PAIN MEDICINE | Facility: CLINIC | Age: 69
End: 2024-07-31
Payer: MEDICARE

## 2024-07-31 ENCOUNTER — TELEPHONE (OUTPATIENT)
Dept: PAIN MEDICINE | Facility: CLINIC | Age: 69
End: 2024-07-31
Payer: MEDICARE

## 2024-08-15 ENCOUNTER — TELEPHONE (OUTPATIENT)
Dept: PAIN MEDICINE | Facility: CLINIC | Age: 69
End: 2024-08-15
Payer: MEDICARE

## 2024-08-15 RX ORDER — PREGABALIN 75 MG/1
75 CAPSULE ORAL 3 TIMES DAILY
Qty: 90 CAPSULE | Refills: 2 | Status: SHIPPED | OUTPATIENT
Start: 2024-08-15 | End: 2024-11-13

## 2024-08-20 ENCOUNTER — HOSPITAL ENCOUNTER (OUTPATIENT)
Facility: HOSPITAL | Age: 69
Discharge: HOME | End: 2024-08-20
Attending: ANESTHESIOLOGY | Admitting: ANESTHESIOLOGY
Payer: MEDICARE

## 2024-08-20 ENCOUNTER — HOSPITAL ENCOUNTER (OUTPATIENT)
Dept: RADIOLOGY | Facility: HOSPITAL | Age: 69
Discharge: HOME | End: 2024-08-20
Attending: ANESTHESIOLOGY | Admitting: ANESTHESIOLOGY
Payer: MEDICARE

## 2024-08-20 VITALS
HEIGHT: 62 IN | TEMPERATURE: 98 F | RESPIRATION RATE: 16 BRPM | BODY MASS INDEX: 27.1 KG/M2 | DIASTOLIC BLOOD PRESSURE: 62 MMHG | SYSTOLIC BLOOD PRESSURE: 128 MMHG | WEIGHT: 147.25 LBS | HEART RATE: 56 BPM | OXYGEN SATURATION: 98 %

## 2024-08-20 DIAGNOSIS — M47.816 LUMBAR SPONDYLOSIS: Primary | ICD-10-CM

## 2024-08-20 DIAGNOSIS — M54.50 LOWER BACK PAIN: ICD-10-CM

## 2024-08-20 PROCEDURE — 64493 INJ PARAVERT F JNT L/S 1 LEV: CPT | Mod: 50,PO | Performed by: ANESTHESIOLOGY

## 2024-08-20 PROCEDURE — 25000003 PHARM REV CODE 250: Mod: PO | Performed by: ANESTHESIOLOGY

## 2024-08-20 PROCEDURE — 64493 INJ PARAVERT F JNT L/S 1 LEV: CPT | Mod: 50,,, | Performed by: ANESTHESIOLOGY

## 2024-08-20 PROCEDURE — 64494 INJ PARAVERT F JNT L/S 2 LEV: CPT | Mod: 50,PO | Performed by: ANESTHESIOLOGY

## 2024-08-20 PROCEDURE — 63600175 PHARM REV CODE 636 W HCPCS: Mod: JZ,JG,PO | Performed by: ANESTHESIOLOGY

## 2024-08-20 PROCEDURE — 64494 INJ PARAVERT F JNT L/S 2 LEV: CPT | Mod: 50,,, | Performed by: ANESTHESIOLOGY

## 2024-08-20 RX ORDER — BUPIVACAINE HYDROCHLORIDE 2.5 MG/ML
INJECTION, SOLUTION EPIDURAL; INFILTRATION; INTRACAUDAL
Status: DISCONTINUED | OUTPATIENT
Start: 2024-08-20 | End: 2024-08-20 | Stop reason: HOSPADM

## 2024-08-20 RX ORDER — LIDOCAINE HYDROCHLORIDE 10 MG/ML
INJECTION, SOLUTION EPIDURAL; INFILTRATION; INTRACAUDAL; PERINEURAL
Status: DISCONTINUED | OUTPATIENT
Start: 2024-08-20 | End: 2024-08-20 | Stop reason: HOSPADM

## 2024-08-20 RX ORDER — ALPRAZOLAM 0.5 MG/1
1 TABLET, ORALLY DISINTEGRATING ORAL ONCE AS NEEDED
Status: COMPLETED | OUTPATIENT
Start: 2024-08-20 | End: 2024-08-20

## 2024-08-20 RX ADMIN — ALPRAZOLAM 0.5 MG: 0.5 TABLET, ORALLY DISINTEGRATING ORAL at 03:08

## 2024-08-20 NOTE — DISCHARGE SUMMARY
[facility]  Discharge Note  Short Stay    Admit Date: 8/20/2024    Discharge Date: 8/20/2024    Attending Physician: Brian Traylor     Discharge Provider: Brian Traylor    Diagnoses:  There are no hospital problems to display for this patient.      Discharged Condition: Good    Final Diagnoses: Lumbar spondylosis [M47.816]    Disposition: Home or Self Care    Hospital Course: No complications, uneventful    Outcome of Hospitalization, Treatment, Procedure, or Surgery:  Patient was admitted for outpatient interventional pain management procedure. The patient tolerated the procedure well with no complications.    Follow up/Patient Instructions:  Follow up as scheduled in Pain Management office in 2-3 weeks.  Patient has received instructions and follow up date and time.    Medications:  Continue previous medications    Discharge Procedure Orders   Notify your health care provider if you experience any of the following:  temperature >100.4     Notify your health care provider if you experience any of the following:  persistent nausea and vomiting or diarrhea     Notify your health care provider if you experience any of the following:  severe uncontrolled pain     Notify your health care provider if you experience any of the following:  redness, tenderness, or signs of infection (pain, swelling, redness, odor or green/yellow discharge around incision site)     Notify your health care provider if you experience any of the following:  difficulty breathing or increased cough     Notify your health care provider if you experience any of the following:  severe persistent headache     Notify your health care provider if you experience any of the following:  worsening rash     Notify your health care provider if you experience any of the following:  persistent dizziness, light-headedness, or visual disturbances     Notify your health care provider if you experience any of the following:  increased confusion or  weakness     Activity as tolerated

## 2024-08-20 NOTE — H&P
[place] - Surgery  History & Physical - Short Stay  Pain Management       SUBJECTIVE:     Procedure: Procedure(s) (LRB):  Block-nerve-medial branch-lumbar L4/5 and L5/S1 (Bilateral)    Chief Complaint/Reason for Admission:  Lumbar spondylosis [M47.816]    PTA Medications   Medication Sig    DULoxetine (CYMBALTA) 60 MG capsule Take 1 capsule (60 mg total) by mouth once daily.    multivitamin (ONE DAILY MULTIVITAMIN) per tablet Take 1 tablet by mouth once daily.    pregabalin (LYRICA) 75 MG capsule Take 1 capsule (75 mg total) by mouth 3 (three) times daily.    ALPRAZolam (XANAX) 0.25 MG tablet Take 1 tablet (0.25 mg total) by mouth 2 (two) times daily as needed for Anxiety.       Review of patient's allergies indicates:  No Known Allergies    Past Medical History:   Diagnosis Date    Colon polyp 2019    Personal history of nicotine dependence      Past Surgical History:   Procedure Laterality Date    CATARACT EXTRACTION, BILATERAL      COLONOSCOPY N/A 11/07/2019    Procedure: COLONOSCOPY;  Surgeon: Pan Duran MD;  Location: [facility];  Service: Endoscopy;  Laterality: N/A;    EPIDURAL STEROID INJECTION INTO LUMBAR SPINE Left 07/07/2023    Procedure: Injection-steroid-epidural-lumbar L3/4;  Surgeon: Brian Traylor MD;  Location: [facility] OR;  Service: Pain Management;  Laterality: Left;    EPIDURAL STEROID INJECTION INTO LUMBAR SPINE N/A 10/04/2023    Procedure: Injection-steroid-epidural-lumbar;  Surgeon: Brian Traylor MD;  Location: [facility] OR;  Service: Pain Management;  Laterality: N/A;  L4/5    EYE SURGERY  10/2013    catract surgery    INJECTION OF ANESTHETIC AGENT AROUND MEDIAL BRANCH NERVES INNERVATING LUMBAR FACET JOINT Bilateral 07/26/2024    Procedure: Block-nerve-medial branch-lumbar    L4/5 L5/S1;  Surgeon: Brian Traylor MD;  Location: [facility] OR;  Service: Pain Management;  Laterality: Bilateral;    TUBAL LIGATION       Family History   Problem Relation Name Age of Onset    Breast cancer Mother  Trinidad Hector 70    Cancer Mother Trinidad Hector     Vision loss Mother Trinidad Hector         macular degeneration    Heart disease Father Brett Hector         malfunction valves    Diabetes Brother Darell Hector      Social History     Tobacco Use    Smoking status: Former     Current packs/day: 0.00     Average packs/day: 1 pack/day for 44.0 years (44.0 ttl pk-yrs)     Types: Cigarettes     Start date: 1976     Quit date: 2020     Years since quittin.0    Smokeless tobacco: Never    Tobacco comments:     using 2 to 3 nicorette tablets to help quit   Substance Use Topics    Alcohol use: Yes     Comment: Glass of wine once a month    Drug use: Never      No current facility-administered medications for this encounter.    Review of Systems:  General ROS: negative for - fever  Dermatological ROS: negative for rash    OBJECTIVE:     Vital Signs (Most Recent):     Body mass index is 26.94 kg/m².    Physical Exam:  General appearance - alert, well appearing, and in no distress  Mental status - AOx3  Eyes - pupils equal and reactive, extraocular eye movements intact  Heart - normal rate, regular rhythm, normal S1, S2, no murmurs, rubs, clicks or gallops  Chest - clear to auscultation, no wheezes, rales or rhonchi, symmetric air entry  Abdomen - soft, nontender, nondistended, no masses or organomegaly  Neurological - alert, oriented, normal speech, no focal findings or movement disorder noted  Extremities - peripheral pulses normal, no pedal edema, no clubbing or cyanosis      ASSESSMENT/PLAN:     There are no hospital problems to display for this patient.     Indication: The patient has clinical and radiologic findings suggestive of facet mediated pain and is s/p 1st diagnostic bilateral lumbar L4/5 and L5/S1 medial branch blocks with at least 80% relief of their axial back pain lasting the duration of the local anesthetic utilized.    We will proceed with 2nd diagnostic bilateral L4/5 and L5/S1  medial branch blocks.    The risks and benefits of this intervention, and alternative therapies were discussed with the patient.  The discussion of risks included infection, bleeding, need for additional procedures or surgery, nerve damage, paralysis, adverse medication reaction(s), stroke, and/or death.  Questions regarding the procedure, risks, expected outcome, and possible side effects were solicited and answered to the patient's satisfaction.  Karla wishes to proceed with the injection.  Verbal and written consent were verified.      Proceed with intervention as scheduled.    Brian Traylor M.D.  Interventional Pain Medicine / Anesthesiology

## 2024-08-20 NOTE — OP NOTE
Procedure Note    Procedure Date: 8/20/2024    Procedure Performed:  bilateral Diagnostic Lumbar Medial Branch @ L4/5 and L5/S1, with Fluoroscopic Guidance    Indications: The patient has clinical and radiologic findings suggestive of facet mediated pain and is s/p 1st diagnostic bilateral lumbar L4/5 and L5/S1 medial branch blocks with at least 80% relief of their axial back pain lasting the duration of the local anesthetic utilized.     Pre-op diagnosis: Lumbar Spondylosis    Post-op diagnosis: same    Physician: Brian Traylor MD    IV Anxiolysis medications: none    Medications injected: Bupivacaine 0.25%, 0.5 cc per level    Local anesthetic used: 1% Lidocaine, 4 ml    Estimated Blood Loss: none    Complications:  none    Technique:  The patient was interviewed in the holding area and Risks/Benefits were discussed, including, but not limited to, the possibility of new or different pain, bleeding or infection.   All questions were answered.  The patient understood and accepted risks.  Consent was reviewed.  A time out was taken to identify the patient, procedure and side of the procedure. The patient was placed in a prone position, then prepped and draped in the usual sterile fashion using ChloraPrep x2 and sterile towels.  The levels were determined under fluoroscopic guidance and then marked.  1% Lidocaine was given by raising a wheal at the skin over each site and then infiltrated approximately 2cm deeper.  A 25-gauge 3.5 inch needle was introduced to the anatomic location of the L3-5 medial branch nerves on the bilateral side.  Then, after negative aspiration, 0.5 cc of Bupivacaine 0.25% was injected @ each level.  The patient tolerated the procedure well.  The patient tolerated the procedure well and was transferred to the [facility] in stable condition.  The patient was monitored after the procedure.  Patient was given post procedure and discharge instructions to follow at home.  The patient was discharged in  a stable condition.    Event Time In   In Facility 1441   In Pre-Procedure 1450   Physician Available    Anesthesia Available    Pre-Op: Bedside Procedure Start    Pre-Op: Bedside Procedure Stop    Pre-Procedure Complete 1518   Out of Pre-Procedure    Anesthesia Start    Anesthesia Start Data Collection    Setup Start    Setup Complete    In Room 1524   Prep Start    Procedure Prep Complete    MD notified pt. ready    Procedure Start 1531   Procedure Closing    Emergence    Procedure Finish 1535   Sedation Start    Scope In    Extent Reached    Scope Out    Sedation End    Out of Room 1537   Cleanup Start    Cleanup Complete    Cosmetic Start    Cosmetic Stop    Pain Mgmt In Room    Pain Mgmt Out Room    In Recovery    Anesthesia Finish    Bedside Procedure Start    Bedside Procedure Stop    Recovery Care Complete    Out of Recovery    To Phase II    In Phase II    Pain Mgmt Recovery Start    Pain Mgmt Recovery Stop    Obs Rec Start    Obs Rec Stop    Phase II Care Complete    Out of Phase II    Procedural Care Complete    Discharge    Pain Follow Up Needed    Pain Follow Up Complete

## 2024-08-20 NOTE — H&P (VIEW-ONLY)
[place] - Surgery  History & Physical - Short Stay  Pain Management       SUBJECTIVE:     Procedure: Procedure(s) (LRB):  Block-nerve-medial branch-lumbar L4/5 and L5/S1 (Bilateral)    Chief Complaint/Reason for Admission:  Lumbar spondylosis [M47.816]    PTA Medications   Medication Sig    DULoxetine (CYMBALTA) 60 MG capsule Take 1 capsule (60 mg total) by mouth once daily.    multivitamin (ONE DAILY MULTIVITAMIN) per tablet Take 1 tablet by mouth once daily.    pregabalin (LYRICA) 75 MG capsule Take 1 capsule (75 mg total) by mouth 3 (three) times daily.    ALPRAZolam (XANAX) 0.25 MG tablet Take 1 tablet (0.25 mg total) by mouth 2 (two) times daily as needed for Anxiety.       Review of patient's allergies indicates:  No Known Allergies    Past Medical History:   Diagnosis Date    Colon polyp 2019    Personal history of nicotine dependence      Past Surgical History:   Procedure Laterality Date    CATARACT EXTRACTION, BILATERAL      COLONOSCOPY N/A 11/07/2019    Procedure: COLONOSCOPY;  Surgeon: Pan Duran MD;  Location: [facility];  Service: Endoscopy;  Laterality: N/A;    EPIDURAL STEROID INJECTION INTO LUMBAR SPINE Left 07/07/2023    Procedure: Injection-steroid-epidural-lumbar L3/4;  Surgeon: Brian Traylor MD;  Location: [facility] OR;  Service: Pain Management;  Laterality: Left;    EPIDURAL STEROID INJECTION INTO LUMBAR SPINE N/A 10/04/2023    Procedure: Injection-steroid-epidural-lumbar;  Surgeon: Brian Traylor MD;  Location: [facility] OR;  Service: Pain Management;  Laterality: N/A;  L4/5    EYE SURGERY  10/2013    catract surgery    INJECTION OF ANESTHETIC AGENT AROUND MEDIAL BRANCH NERVES INNERVATING LUMBAR FACET JOINT Bilateral 07/26/2024    Procedure: Block-nerve-medial branch-lumbar    L4/5 L5/S1;  Surgeon: Brian Traylor MD;  Location: [facility] OR;  Service: Pain Management;  Laterality: Bilateral;    TUBAL LIGATION       Family History   Problem Relation Name Age of Onset    Breast cancer Mother  Trinidad Hector 70    Cancer Mother Trinidad Hector     Vision loss Mother Trinidad Hector         macular degeneration    Heart disease Father Brett Hector         malfunction valves    Diabetes Brother Darell Hector      Social History     Tobacco Use    Smoking status: Former     Current packs/day: 0.00     Average packs/day: 1 pack/day for 44.0 years (44.0 ttl pk-yrs)     Types: Cigarettes     Start date: 1976     Quit date: 2020     Years since quittin.0    Smokeless tobacco: Never    Tobacco comments:     using 2 to 3 nicorette tablets to help quit   Substance Use Topics    Alcohol use: Yes     Comment: Glass of wine once a month    Drug use: Never      No current facility-administered medications for this encounter.    Review of Systems:  General ROS: negative for - fever  Dermatological ROS: negative for rash    OBJECTIVE:     Vital Signs (Most Recent):     Body mass index is 26.94 kg/m².    Physical Exam:  General appearance - alert, well appearing, and in no distress  Mental status - AOx3  Eyes - pupils equal and reactive, extraocular eye movements intact  Heart - normal rate, regular rhythm, normal S1, S2, no murmurs, rubs, clicks or gallops  Chest - clear to auscultation, no wheezes, rales or rhonchi, symmetric air entry  Abdomen - soft, nontender, nondistended, no masses or organomegaly  Neurological - alert, oriented, normal speech, no focal findings or movement disorder noted  Extremities - peripheral pulses normal, no pedal edema, no clubbing or cyanosis      ASSESSMENT/PLAN:     There are no hospital problems to display for this patient.     Indication: The patient has clinical and radiologic findings suggestive of facet mediated pain and is s/p 1st diagnostic bilateral lumbar L4/5 and L5/S1 medial branch blocks with at least 80% relief of their axial back pain lasting the duration of the local anesthetic utilized.    We will proceed with 2nd diagnostic bilateral L4/5 and L5/S1  medial branch blocks.    The risks and benefits of this intervention, and alternative therapies were discussed with the patient.  The discussion of risks included infection, bleeding, need for additional procedures or surgery, nerve damage, paralysis, adverse medication reaction(s), stroke, and/or death.  Questions regarding the procedure, risks, expected outcome, and possible side effects were solicited and answered to the patient's satisfaction.  Karla wishes to proceed with the injection.  Verbal and written consent were verified.      Proceed with intervention as scheduled.    Brian Traylor M.D.  Interventional Pain Medicine / Anesthesiology

## 2024-08-23 ENCOUNTER — TELEPHONE (OUTPATIENT)
Dept: PAIN MEDICINE | Facility: CLINIC | Age: 69
End: 2024-08-23
Payer: MEDICARE

## 2024-08-23 NOTE — TELEPHONE ENCOUNTER
----- Message from Nathalia Zarco sent at 8/23/2024 10:26 AM CDT -----  Contact: Patient  Type:  Needs Medical Advice    Who Called: Patient     Would the patient rather a call back or a response via [facility]? Call    Best Call Back Number: [phone] (home)     Additional Information: Patient states that the temp nerve block did work for more than 8 hours. Patient states that she is ready to schedule the permanent nerve block. Please advise

## 2024-08-23 NOTE — TELEPHONE ENCOUNTER
Physician - Dr Traylor    Type of Procedure/Injection - Lumbar Radiofrequency Ablation  L4/5 and L5/S1           Laterality - Bilateral      Anxiolysis- RNIV      Need to hold medication - No      Clearance needed - No      Follow up - 4 week

## 2024-08-26 ENCOUNTER — TELEPHONE (OUTPATIENT)
Dept: PAIN MEDICINE | Facility: CLINIC | Age: 69
End: 2024-08-26
Payer: MEDICARE

## 2024-08-26 DIAGNOSIS — M47.816 LUMBAR SPONDYLOSIS: Primary | ICD-10-CM

## 2024-08-26 RX ORDER — SODIUM CHLORIDE, SODIUM LACTATE, POTASSIUM CHLORIDE, CALCIUM CHLORIDE 600; 310; 30; 20 MG/100ML; MG/100ML; MG/100ML; MG/100ML
INJECTION, SOLUTION INTRAVENOUS CONTINUOUS
OUTPATIENT
Start: 2024-08-26

## 2024-08-26 NOTE — TELEPHONE ENCOUNTER
Regarding your Lumbar Medial Branch Block , For Date of Service:  #2 8/20    Please answer the following questions:    1. What percentage of pain relief did you receive following the block, from 0-100%? 80%  2. How many hours did pain relief last following the block?  8    3. During this time please describe in detail the activities you were able to do? Walking up stairs, bending over to tie shoes, housework    4. Pain score from 0-10 pre procedure - 5     5. Pain score from 0-10 post procedure - 1

## 2024-09-17 ENCOUNTER — HOSPITAL ENCOUNTER (OUTPATIENT)
Dept: RADIOLOGY | Facility: HOSPITAL | Age: 69
Discharge: HOME | End: 2024-09-17
Attending: ANESTHESIOLOGY | Admitting: ANESTHESIOLOGY
Payer: MEDICARE

## 2024-09-17 ENCOUNTER — HOSPITAL ENCOUNTER (OUTPATIENT)
Facility: HOSPITAL | Age: 69
Discharge: HOME | End: 2024-09-17
Attending: ANESTHESIOLOGY | Admitting: ANESTHESIOLOGY
Payer: MEDICARE

## 2024-09-17 DIAGNOSIS — M54.50 LOWER BACK PAIN: ICD-10-CM

## 2024-09-17 DIAGNOSIS — M47.816 LUMBAR SPONDYLOSIS: Primary | ICD-10-CM

## 2024-09-17 PROCEDURE — 63600175 PHARM REV CODE 636 W HCPCS: Mod: PO | Performed by: ANESTHESIOLOGY

## 2024-09-17 PROCEDURE — 64635 DESTROY LUMB/SAC FACET JNT: CPT | Mod: 50,PO | Performed by: ANESTHESIOLOGY

## 2024-09-17 PROCEDURE — 25000003 PHARM REV CODE 250: Mod: PO | Performed by: ANESTHESIOLOGY

## 2024-09-17 PROCEDURE — 64636 DESTROY L/S FACET JNT ADDL: CPT | Mod: 50,,, | Performed by: ANESTHESIOLOGY

## 2024-09-17 PROCEDURE — 64635 DESTROY LUMB/SAC FACET JNT: CPT | Mod: 50,,, | Performed by: ANESTHESIOLOGY

## 2024-09-17 PROCEDURE — 99152 MOD SED SAME PHYS/QHP 5/>YRS: CPT | Mod: ,,, | Performed by: ANESTHESIOLOGY

## 2024-09-17 PROCEDURE — 64636 DESTROY L/S FACET JNT ADDL: CPT | Mod: 50,PO | Performed by: ANESTHESIOLOGY

## 2024-09-17 RX ORDER — TRIAMCINOLONE ACETONIDE 40 MG/ML
INJECTION, SUSPENSION INTRA-ARTICULAR; INTRAMUSCULAR
Status: DISCONTINUED | OUTPATIENT
Start: 2024-09-17 | End: 2024-09-17 | Stop reason: HOSPADM

## 2024-09-17 RX ORDER — LIDOCAINE HYDROCHLORIDE 10 MG/ML
INJECTION, SOLUTION EPIDURAL; INFILTRATION; INTRACAUDAL; PERINEURAL
Status: DISCONTINUED | OUTPATIENT
Start: 2024-09-17 | End: 2024-09-17 | Stop reason: HOSPADM

## 2024-09-17 RX ORDER — BUPIVACAINE HYDROCHLORIDE 2.5 MG/ML
INJECTION, SOLUTION EPIDURAL; INFILTRATION; INTRACAUDAL
Status: DISCONTINUED | OUTPATIENT
Start: 2024-09-17 | End: 2024-09-17 | Stop reason: HOSPADM

## 2024-09-17 RX ORDER — SODIUM CHLORIDE, SODIUM LACTATE, POTASSIUM CHLORIDE, CALCIUM CHLORIDE 600; 310; 30; 20 MG/100ML; MG/100ML; MG/100ML; MG/100ML
INJECTION, SOLUTION INTRAVENOUS CONTINUOUS
Status: DISCONTINUED | OUTPATIENT
Start: 2024-09-17 | End: 2024-09-17 | Stop reason: HOSPADM

## 2024-09-17 RX ORDER — LIDOCAINE HYDROCHLORIDE 20 MG/ML
INJECTION, SOLUTION EPIDURAL; INFILTRATION; INTRACAUDAL; PERINEURAL
Status: DISCONTINUED | OUTPATIENT
Start: 2024-09-17 | End: 2024-09-17 | Stop reason: HOSPADM

## 2024-09-17 RX ORDER — MIDAZOLAM HYDROCHLORIDE 1 MG/ML
INJECTION INTRAMUSCULAR; INTRAVENOUS
Status: DISCONTINUED | OUTPATIENT
Start: 2024-09-17 | End: 2024-09-17 | Stop reason: HOSPADM

## 2024-09-17 RX ADMIN — SODIUM CHLORIDE, SODIUM LACTATE, POTASSIUM CHLORIDE, AND CALCIUM CHLORIDE: .6; .31; .03; .02 INJECTION, SOLUTION INTRAVENOUS at 01:09

## 2024-09-17 NOTE — OP NOTE
Procedure Note    Procedure Date: 9/17/2024    Procedure Performed:  Radiofrequency ablation of the L4/5 and L5/S1 medial branch nerves on the  bilateral side utilizing fluoroscopy    Indication: The patient has clinical and radiologic findings suggestive of facet mediated pain and is s/p 2nd diagnostic bilateral lumbar L4/5 and L5/S1 medial branch blocks with at least 80% relief of their axial back pain lasting the duration of the local anesthetic utilized.     Pre-op diagnosis: Lumbar Spondylosis    Post-op diagnosis: same    Physician: Brian Traylor MD    IV Sedation medications: Moderate sedation was achieved with midazolam 2 mg   Continuous monitoring of EKG, blood pressure and pulse oximetry was provided by a registered nurse during the entire course of the procedure under my supervision and recorded in the patient's medical record.   Total time for sedation was 26 minutes.    Medications injected:  Pre-lesion, 1ml of 2% lidocaine at each level.  From a mixture of 5ml of 0.25% bupivacaine and 40mg of methylprednisolone, 1ml of this solution was injected at each level post-lesion.    Local anesthetic used: 1% Lidocaine, 4 ml    Estimated Blood Loss: none    Complications:  none    Technique:  The patient was interviewed in the holding area and Risks/Benefits were discussed, including, but not limited to, the possibility of new or different pain, bleeding or infection.   All questions were answered.  The patient understood and accepted risks.  Consent was reviewed.  A time out was taken to identify the patient, procedure and side of the procedure. The patient was placed in a prone position, then prepped and draped in the usual sterile fashion using ChloraPrep x2 and sterile towels.  The levels were determined under fluoroscopic guidance and then marked.  AP and oblique fluoroscopy were used to identify and anay the junctions between the superior articular processes and transverse processes at the L3-5 levels on  the Bilateral side.  The Bilateral sacral ala was also identified and marked.  The skin and subcutaneous tissues in these identified areas were anesthetized with 1% lidocaine. A 20-gauge 100 mm [employer] RF needle was advanced towards each of these points under fluoroscopic guidance such that the tip of the needle was positioned posterior to the Neuro-foramen on lateral fluoroscopic view.  Then, each needle was positioned such that, on stimulation, the patient had an appropriate pain response between 0.3-0.5 V, with no motor response, other than Lumbar Paraspinal Muscles up to 2.0V.  One mL of 2% lidocaine was then injected at each level prior to lesioning, which was performed for 90 seconds at 80°C.  Once the lesion was complete, 1 mL of a solution consisting of 5 mL 0.25% bupivacaine and 40mg methylprednisolone was injected through each probe. The probes were removed with a 1% lidocaine flush.  The patient tolerated the procedure well.  The patient was monitored after the procedure.  Patient was given post procedure and discharge instructions to follow at home.  The patient was discharged in a stable condition.    Event Time In   In Facility 1153   In Pre-Procedure 1159   Physician Available    Anesthesia Available    Pre-Op: Bedside Procedure Start    Pre-Op: Bedside Procedure Stop    Pre-Procedure Complete 1305   Out of Pre-Procedure    Anesthesia Start    Anesthesia Start Data Collection    Setup Start    Setup Complete    In Room 1306   Prep Start    Procedure Prep Complete    MD notified pt. ready    Procedure Start 1312   Procedure Closing    Emergence    Procedure Finish 1328   Sedation Start 1305   Scope In    Extent Reached    Scope Out    Sedation End 1331   Out of Room 1331   Cleanup Start    Cleanup Complete    Cosmetic Start    Cosmetic Stop    Pain Mgmt In Room    Pain Mgmt Out Room    In Recovery    Anesthesia Finish    Bedside Procedure Start    Bedside Procedure Stop    Recovery Care Complete    Out  of Recovery    To Phase II    In Phase II    Pain Mgmt Recovery Start 1332   Pain Mgmt Recovery Stop    Obs Rec Start    Obs Rec Stop    Phase II Care Complete    Out of Phase II    Procedural Care Complete    Discharge    Pain Follow Up Needed    Pain Follow Up Complete

## 2024-09-17 NOTE — INTERVAL H&P NOTE
The patient has been examined and the H&P has been reviewed:    I concur with the findings and no changes have occurred since H&P was written.  Indication: The patient has clinical and radiologic findings suggestive of facet mediated pain and is s/p 2nd diagnostic bilateral lumbar L4/5 and L5/S1 medial branch blocks with at least 80% relief of their axial back pain lasting the duration of the local anesthetic utilized.  We will proceed with bilateral L4/5 and L5/S1 RFA. The risks and benefits of this intervention, and alternative therapies were discussed with the patient.  The discussion of risks included infection, bleeding, need for additional procedures or surgery, nerve damage.  Questions regarding the procedure, risks, expected outcome, and possible side effects were solicited and answered to the patient's satisfaction.  Karla Bonilla wishes to proceed with the injection or procedure.  Written consent was obtained.  ASA 2, MP II        There are no hospital problems to display for this patient.

## 2024-09-17 NOTE — DISCHARGE SUMMARY
[facility]  Discharge Note  Short Stay    Admit Date: 9/17/2024    Discharge Date: 9/17/2024    Attending Physician: Brian Traylor     Discharge Provider: Brian Traylor    Diagnoses:  There are no hospital problems to display for this patient.      Discharged Condition: Good    Final Diagnoses: Lumbar spondylosis [M47.816]    Disposition: Home or Self Care    Hospital Course: No complications, uneventful    Outcome of Hospitalization, Treatment, Procedure, or Surgery:  Patient was admitted for outpatient interventional pain management procedure. The patient tolerated the procedure well with no complications.    Follow up/Patient Instructions:  Follow up as scheduled in Pain Management office in 2-3 weeks.  Patient has received instructions and follow up date and time.    Medications:  Continue previous medications    Discharge Procedure Orders   Notify your health care provider if you experience any of the following:  temperature >100.4     Notify your health care provider if you experience any of the following:  persistent nausea and vomiting or diarrhea     Notify your health care provider if you experience any of the following:  severe uncontrolled pain     Notify your health care provider if you experience any of the following:  redness, tenderness, or signs of infection (pain, swelling, redness, odor or green/yellow discharge around incision site)     Notify your health care provider if you experience any of the following:  difficulty breathing or increased cough     Notify your health care provider if you experience any of the following:  severe persistent headache     Notify your health care provider if you experience any of the following:  worsening rash     Notify your health care provider if you experience any of the following:  persistent dizziness, light-headedness, or visual disturbances     Notify your health care provider if you experience any of the following:  increased confusion or  weakness     Activity as tolerated

## 2024-09-18 VITALS
BODY MASS INDEX: 27.05 KG/M2 | RESPIRATION RATE: 20 BRPM | HEART RATE: 55 BPM | DIASTOLIC BLOOD PRESSURE: 58 MMHG | HEIGHT: 62 IN | SYSTOLIC BLOOD PRESSURE: 132 MMHG | TEMPERATURE: 98 F | WEIGHT: 147 LBS | OXYGEN SATURATION: 100 %

## 2024-09-18 RX ORDER — PREGABALIN 75 MG/1
75 CAPSULE ORAL 3 TIMES DAILY
Qty: 90 CAPSULE | Refills: 2 | Status: SHIPPED | OUTPATIENT
Start: 2024-09-18 | End: 2024-12-17

## 2024-10-11 ENCOUNTER — TELEPHONE (OUTPATIENT)
Dept: PAIN MEDICINE | Facility: CLINIC | Age: 69
End: 2024-10-11
Payer: MEDICARE

## 2024-10-15 ENCOUNTER — PATIENT MESSAGE (OUTPATIENT)
Dept: RESEARCH | Facility: HOSPITAL | Age: 69
End: 2024-10-15
Payer: MEDICARE

## 2024-10-18 ENCOUNTER — OFFICE VISIT (OUTPATIENT)
Dept: PAIN MEDICINE | Facility: CLINIC | Age: 69
End: 2024-10-18
Payer: MEDICARE

## 2024-10-18 VITALS
DIASTOLIC BLOOD PRESSURE: 72 MMHG | HEIGHT: 62 IN | BODY MASS INDEX: 27.18 KG/M2 | WEIGHT: 147.69 LBS | SYSTOLIC BLOOD PRESSURE: 109 MMHG | HEART RATE: 65 BPM

## 2024-10-18 DIAGNOSIS — M47.816 LUMBAR SPONDYLOSIS: Primary | ICD-10-CM

## 2024-10-18 DIAGNOSIS — M54.51 VERTEBROGENIC LOW BACK PAIN: ICD-10-CM

## 2024-10-18 DIAGNOSIS — M54.9 DORSALGIA: ICD-10-CM

## 2024-10-18 DIAGNOSIS — M43.16 SPONDYLOLISTHESIS OF LUMBAR REGION: ICD-10-CM

## 2024-10-18 PROCEDURE — 99999 PR PBB SHADOW E&M-EST. PATIENT-LVL III: CPT | Mod: PBBFAC,,,

## 2024-10-18 NOTE — PROGRESS NOTES
[facility] Follow Up Evaluation      Referred by: No ref. provider found    PCP: Dr. Adriane Granados    CC:   Chief Complaint   Patient presents with    Low-back Pain          10/18/2024     2:18 PM 4/4/2024     9:22 AM 1/2/2024    10:49 AM   Last 3 PDI Scores   Pain Disability Index (PDI) 4 14 10       Interval HPI 10/18/2024: Karla Bonilla returns to the office for follow up.  She is s/p bilateral L4/5 and L5/S1 RFA on 09/17/2024 with 95% relief.  Overall, she found significant improvement in her lower back pain.  No significant remaining pain.  No significant radicular pain, no new numbness, weakness or any new changes to her bowel or bladder function.      HPI:   Karla Bonilla is a 69 y.o. female patient who has a past medical history of Colon polyp and Personal history of nicotine dependence. She presents with low back and left leg pain.  She reports having a history of right-sided pain but this is currently not bothering her.  She describes pain in the left low back that radiates into the left buttock and posterior thigh down to her knee.  This has been present for the past 3 and half months.  She attributes this to an ankle fracture last year, being sedentary and then starting to walk again.  She describes the pain as throbbing, deep, sharp and shooting.  It is worse with sitting, standing, bending, touching, walking, nighttime, morning, extension, flexing, lifting, getting out of a bed or a chair.  The only relief she has found has been with gabapentin 300 mg nightly.  She saw her primary care physician yesterday and was instructed to increase to twice a day.  She has been doing physician directed home exercises for the past several months without any relief and her pain is currently too severe to go to physical therapy.  She denies having weakness or numbness, denies bladder or bowel incontinence.    Pain Intervention History:  - s/p L3-4 WONG on 7/7/23 with 80-85%  relief  - She is status post L4/5 interlaminar epidural steroid injection on 10/04/2023 with 50% relief.  - s/p bilateral L4/5 and L5/S1 RFA on 09/17/2024 with 95% relief.    Past Spine Surgical History:      Past and current medications:  Antineuropathics:  Gabapentin 300 mg twice daily  NSAIDs:  Took ibuprofen for several months but developed side effects and discontinued  Physical therapy:  Physician directed home exercise program for about 3 months.  Completed PT and continues home exercise program.  Antidepressants:  Muscle relaxers:  Opioids:  Antiplatelets/Anticoagulants:    History:    Current Outpatient Medications:     DULoxetine (CYMBALTA) 60 MG capsule, Take 1 capsule (60 mg total) by mouth once daily., Disp: 30 capsule, Rfl: 11    multivitamin (ONE DAILY MULTIVITAMIN) per tablet, Take 1 tablet by mouth once daily., Disp: , Rfl:     pregabalin (LYRICA) 75 MG capsule, Take 1 capsule (75 mg total) by mouth 3 (three) times daily., Disp: 90 capsule, Rfl: 2    ALPRAZolam (XANAX) 0.25 MG tablet, Take 1 tablet (0.25 mg total) by mouth 2 (two) times daily as needed for Anxiety., Disp: 30 tablet, Rfl: 0    Past Medical History:   Diagnosis Date    Colon polyp 2019    Personal history of nicotine dependence        Past Surgical History:   Procedure Laterality Date    CATARACT EXTRACTION, BILATERAL      COLONOSCOPY N/A 11/07/2019    Procedure: COLONOSCOPY;  Surgeon: Pan Duran MD;  Location: [facility];  Service: Endoscopy;  Laterality: N/A;    EPIDURAL STEROID INJECTION INTO LUMBAR SPINE Left 07/07/2023    Procedure: Injection-steroid-epidural-lumbar L3/4;  Surgeon: Brian Traylor MD;  Location: [facility] OR;  Service: Pain Management;  Laterality: Left;    EPIDURAL STEROID INJECTION INTO LUMBAR SPINE N/A 10/04/2023    Procedure: Injection-steroid-epidural-lumbar;  Surgeon: Brian Traylor MD;  Location: [facility] OR;  Service: Pain Management;  Laterality: N/A;  L4/5    EYE SURGERY  10/2013    catract surgery     INJECTION OF ANESTHETIC AGENT AROUND MEDIAL BRANCH NERVES INNERVATING LUMBAR FACET JOINT Bilateral 2024    Procedure: Block-nerve-medial branch-lumbar    L4/5 L5/S1;  Surgeon: Brian Traylor MD;  Location: [facility] OR;  Service: Pain Management;  Laterality: Bilateral;    INJECTION OF ANESTHETIC AGENT AROUND MEDIAL BRANCH NERVES INNERVATING LUMBAR FACET JOINT Bilateral 2024    Procedure: Block-nerve-medial branch-lumbar L4/5 and L5/S1;  Surgeon: Brian Traylor MD;  Location: [facility] OR;  Service: Pain Management;  Laterality: Bilateral;    RADIOFREQUENCY ABLATION OF LUMBAR MEDIAL BRANCH NERVE AT SINGLE LEVEL Bilateral 2024    Procedure: Radiofrequency Ablation, Nerve, Spinal, Lumbar, Medial Branch, L4/5, L5/S1;  Surgeon: Brian Traylor MD;  Location: [facility] OR;  Service: Pain Management;  Laterality: Bilateral;    TUBAL LIGATION         Family History   Problem Relation Name Age of Onset    Breast cancer Mother Trinidad Hector 70    Cancer Mother Trinidad Hector     Vision loss Mother Trinidad Hector         macular degeneration    Heart disease Father Brett Hector         malfunction valves    Diabetes Brother Darell Hector        Social History     Socioeconomic History    Marital status:    Tobacco Use    Smoking status: Former     Current packs/day: 0.00     Average packs/day: 1 pack/day for 44.0 years (44.0 ttl pk-yrs)     Types: Cigarettes     Start date: 1976     Quit date: 2020     Years since quittin.2    Smokeless tobacco: Never    Tobacco comments:     using 2 to 3 nicorette tablets to help quit   Substance and Sexual Activity    Alcohol use: Yes     Comment: Glass of wine once a month    Drug use: Never    Sexual activity: Yes     Partners: Male     Birth control/protection: Post-menopausal     Social Drivers of Health     Financial Resource Strain: Low Risk  (2024)    Overall Financial Resource Strain (CARDIA)     Difficulty of Paying Living Expenses: Not hard at  "all   Food Insecurity: Unknown (7/27/2024)    Hunger Vital Sign     Worried About Running Out of Food in the Last Year: Never true   Transportation Needs: No Transportation Needs (9/26/2023)    PRAPARE - Transportation     Lack of Transportation (Medical): No     Lack of Transportation (Non-Medical): No   Physical Activity: Insufficiently Active (7/27/2024)    Exercise Vital Sign     Days of Exercise per Week: 2 days     Minutes of Exercise per Session: 30 min   Stress: Stress Concern Present (7/27/2024)    [place] [employer] - Occupational Stress Questionnaire     Feeling of Stress : To some extent   Housing Stability: Low Risk  (9/26/2023)    Housing Stability Vital Sign     Unable to Pay for Housing in the Last Year: No     Number of Places Lived in the Last Year: 1     Unstable Housing in the Last Year: No       Review of patient's allergies indicates:  No Known Allergies    Review of Systems:  The patient reports stomach pain, back pain, anxiety and loss of balance.  Balance of review of systems is negative.    Physical Exam:  Vitals:    10/18/24 1419   BP: 109/72   Pulse: 65   Weight: 67 kg (147 lb 11.3 oz)   Height: 5' 2" (1.575 m)   PainSc: 10-Worst pain ever   PainLoc: Back       Body mass index is 27.02 kg/m².    Gen: NAD  Psych: mood appropriate for given condition  HEENT: eyes anicteric   CV: RRR  HEENT: anicteric   Respiratory: non-labored, no signs of respiratory distress  Abd: non-distended  Skin: warm, dry and intact.  Gait: antalgic    Reproducible pain with lumbar axial facet loading    Sensory:  Intact and symmetrical to light touch in L1-S1 dermatomes bilaterally.    Motor:     Right Left   L2/3 Iliacus Hip flexion  5  5   L3/4 Qudratus Femoris Knee Extension  5  5   L4/5 Tib Anterior Ankle Dorsiflexion   5  5   L5/S1 Extensor Hallicus Longus Great toe extension  5  5   S1/S2 Gastroc/Soleus Plantar Flexion  5  5      Right Left                  Patellar DTR 2+ 2+   Achilles " DTR 0+ 0+                      Labs:  Lab Results   Component Value Date    HGBA1C 5.1 07/31/2024       Lab Results   Component Value Date    WBC 4.32 07/31/2024    HGB 13.1 07/31/2024    HCT 40.0 07/31/2024    MCV 95 07/31/2024     07/31/2024       Imaging:  MRI lumbar spine 06/16/2023  NOMENCLATURE: Five lumbar type vertebral bodies.     CORD/CAUDA EQUINA: Conus has normal size and signal and ends at a normal level of L1-L2.     ALIGNMENT: Trace retrolisthesis of L1 on L2.  4 mm retrolisthesis of L2 on L3.  Trace retrolisthesis of L3 on L4.  Trace grade 1 anterolisthesis of L4 on L5.     BONES: Vertebral body heights are maintained.  Type 1 endplate changes on the left at L1-2, L2-3 and to lesser extent at L3-4 and L4-5.  Several tiny Schmorl's nodes.  No aggressive bone marrow signal.     PARASPINAL AREA: Tiny possible synovial cyst in the left dorsal paraspinal soft tissues near the tip of the left L5-S1 facet (series 7, image 14).     LUMBAR DISC LEVELS:     T12-L1: No disc herniation or significant posterior osteophytic ridging.  No significant spinal canal or foraminal stenosis.  L1-L2: Trace retrolisthesis.  Mild disc bulge.  Disc height loss.  Mild bilateral facet hypertrophy.  Minimal narrowing of the bilateral lateral recesses.  No significant spinal canal stenosis.  Mild left foraminal stenosis.  No significant spinal canal or foraminal stenosis.  L2-L3: Retrolisthesis.  Uncovering the disc and mild disc bulge.  Mild bilateral facet hypertrophy.  Ligamentum flavum thickening.  Moderate left and mild right narrowing of the lateral recesses.  No significant spinal canal stenosis.  Moderate right and mild left foraminal stenosis.  L3-L4: Trace retrolisthesis.  Mild disc bulge.  Mild-moderate right and mild left facet hypertrophy.  Ligamentum flavum thickening.  Mild narrowing of the bilateral lateral recesses.  No significant spinal canal stenosis.  Mild bilateral foraminal stenosis.  L4-L5: Trace  anterolisthesis.  Mild disc bulge.  Moderate right and mild-moderate left facet hypertrophy.  Ligamentum flavum thickening.  Mild narrowing of the bilateral lateral recesses.  No significant spinal canal stenosis.  Mild bilateral foraminal stenosis.  L5-S1: Minimal disc bulge.  Mild-moderate left and mild right facet hypertrophy.  No significant spinal canal stenosis. Mild right greater than left foraminal stenosis.    Assessment:   Problem List Items Addressed This Visit    None  Visit Diagnoses       Lumbar spondylosis    -  Primary    Dorsalgia        Spondylolisthesis of lumbar region        Vertebrogenic low back pain                    Karla Bonilla is a 69 y.o. female patient who has a past medical history of Colon polyp and Personal history of nicotine dependence. She presents with low back     10/18/2024: Karla Bonilla returns to the office for follow up.  She is s/p bilateral L4/5 and L5/S1 RFA on 09/17/2024 with 95% relief.  Overall, she found significant improvement in her lower back pain.  No significant remaining pain.  No significant radicular pain, no new numbness, weakness or any new changes to her bowel or bladder function.    - on exam she has full strength in her lower extremities  - She is s/p bilateral L4/5 and L5/S1 RFA on 09/17/2024 with 95% relief.    - I independently reviewed her lumbar MRI and she has multilevel bilateral facet arthropathy  - she responded very well to the lumbar RFA, no significant remaining lower back pain at this time.  - follow up as needed, can repeat lumbar epidural in the future when indicated.      :  Reviewed    This note was completed with dictation software and grammatical errors may exist.

## 2024-10-23 RX ORDER — PREGABALIN 75 MG/1
75 CAPSULE ORAL 3 TIMES DAILY
Qty: 90 CAPSULE | Refills: 2 | Status: SHIPPED | OUTPATIENT
Start: 2024-10-23 | End: 2025-01-21

## 2024-11-27 RX ORDER — PREGABALIN 75 MG/1
75 CAPSULE ORAL 3 TIMES DAILY
Qty: 90 CAPSULE | Refills: 2 | Status: SHIPPED | OUTPATIENT
Start: 2024-11-27 | End: 2025-02-25

## 2024-12-26 NOTE — TELEPHONE ENCOUNTER
Lov 12/18/24  
Medical Necessity Clause: This procedure was medically necessary because the lesions that were treated were:
Spray Paint Text: The liquid nitrogen was applied to the skin utilizing a spray paint frosting technique.
Show Applicator Variable?: Yes
Render Note In Bullet Format When Appropriate: No
Consent: The patient's consent was obtained including but not limited to risks of crusting, scabbing, blistering, scarring, darker or lighter pigmentary change, recurrence, incomplete removal and infection.
Medical Necessity Information: It is in your best interest to select a reason for this procedure from the list below. All of these items fulfill various CMS LCD requirements except the new and changing color options.
Detail Level: Detailed
Post-Care Instructions: I reviewed with the patient in detail post-care instructions. Patient is to wear sunprotection, and avoid picking at any of the treated lesions. Pt may apply Vaseline to crusted or scabbing areas.

## 2024-12-27 RX ORDER — DULOXETIN HYDROCHLORIDE 60 MG/1
60 CAPSULE, DELAYED RELEASE ORAL
Qty: 30 CAPSULE | Refills: 11 | Status: SHIPPED | OUTPATIENT
Start: 2024-12-27

## 2025-02-27 ENCOUNTER — OFFICE VISIT (OUTPATIENT)
Dept: PAIN MEDICINE | Facility: CLINIC | Age: 70
End: 2025-02-27
Payer: MEDICARE

## 2025-02-27 ENCOUNTER — TELEPHONE (OUTPATIENT)
Dept: PAIN MEDICINE | Facility: CLINIC | Age: 70
End: 2025-02-27

## 2025-02-27 VITALS
HEART RATE: 59 BPM | SYSTOLIC BLOOD PRESSURE: 115 MMHG | BODY MASS INDEX: 27.77 KG/M2 | WEIGHT: 150.88 LBS | HEIGHT: 62 IN | DIASTOLIC BLOOD PRESSURE: 62 MMHG

## 2025-02-27 DIAGNOSIS — M54.16 LUMBAR RADICULOPATHY: Primary | ICD-10-CM

## 2025-02-27 DIAGNOSIS — M54.9 DORSALGIA: ICD-10-CM

## 2025-02-27 PROCEDURE — 1159F MED LIST DOCD IN RCRD: CPT | Mod: CPTII,S$GLB,, | Performed by: ANESTHESIOLOGY

## 2025-02-27 PROCEDURE — 3078F DIAST BP <80 MM HG: CPT | Mod: CPTII,S$GLB,, | Performed by: ANESTHESIOLOGY

## 2025-02-27 PROCEDURE — 1101F PT FALLS ASSESS-DOCD LE1/YR: CPT | Mod: CPTII,S$GLB,, | Performed by: ANESTHESIOLOGY

## 2025-02-27 PROCEDURE — 1160F RVW MEDS BY RX/DR IN RCRD: CPT | Mod: CPTII,S$GLB,, | Performed by: ANESTHESIOLOGY

## 2025-02-27 PROCEDURE — 3008F BODY MASS INDEX DOCD: CPT | Mod: CPTII,S$GLB,, | Performed by: ANESTHESIOLOGY

## 2025-02-27 PROCEDURE — 3288F FALL RISK ASSESSMENT DOCD: CPT | Mod: CPTII,S$GLB,, | Performed by: ANESTHESIOLOGY

## 2025-02-27 PROCEDURE — 99214 OFFICE O/P EST MOD 30 MIN: CPT | Mod: S$GLB,,, | Performed by: ANESTHESIOLOGY

## 2025-02-27 PROCEDURE — 3074F SYST BP LT 130 MM HG: CPT | Mod: CPTII,S$GLB,, | Performed by: ANESTHESIOLOGY

## 2025-02-27 PROCEDURE — 1125F AMNT PAIN NOTED PAIN PRSNT: CPT | Mod: CPTII,S$GLB,, | Performed by: ANESTHESIOLOGY

## 2025-02-27 PROCEDURE — 99999 PR PBB SHADOW E&M-EST. PATIENT-LVL III: CPT | Mod: PBBFAC,,, | Performed by: ANESTHESIOLOGY

## 2025-02-27 RX ORDER — SODIUM CHLORIDE, SODIUM LACTATE, POTASSIUM CHLORIDE, CALCIUM CHLORIDE 600; 310; 30; 20 MG/100ML; MG/100ML; MG/100ML; MG/100ML
INJECTION, SOLUTION INTRAVENOUS CONTINUOUS
OUTPATIENT
Start: 2025-02-27

## 2025-02-27 RX ORDER — NAPROXEN SODIUM 220 MG
220 TABLET ORAL 2 TIMES DAILY WITH MEALS
COMMUNITY

## 2025-02-27 NOTE — PROGRESS NOTES
[facility] Follow Up Evaluation      Referred by: No ref. provider found    PCP: Dr. Adriane Granados    CC:   Chief Complaint   Patient presents with    Low-back Pain          2/27/2025     8:35 AM 10/18/2024     2:18 PM 4/4/2024     9:22 AM   Last 3 PDI Scores   Pain Disability Index (PDI) 32 4 14       Interval HPI 2/27/2025: Karla Bonilla returns to the office for follow up.  Today she reports worsening lower back pain that can radiate down into the left buttock into the left thigh.  Pain is worse with standing and activities.  She rates her pain as 7/10.  No numbness or weakness.      HPI:   Karla Bonilla is a 69 y.o. female patient who has a past medical history of Colon polyp and Personal history of nicotine dependence. She presents with low back and left leg pain.  She reports having a history of right-sided pain but this is currently not bothering her.  She describes pain in the left low back that radiates into the left buttock and posterior thigh down to her knee.  This has been present for the past 3 and half months.  She attributes this to an ankle fracture last year, being sedentary and then starting to walk again.  She describes the pain as throbbing, deep, sharp and shooting.  It is worse with sitting, standing, bending, touching, walking, nighttime, morning, extension, flexing, lifting, getting out of a bed or a chair.  The only relief she has found has been with gabapentin 300 mg nightly.  She saw her primary care physician yesterday and was instructed to increase to twice a day.  She has been doing physician directed home exercises for the past several months without any relief and her pain is currently too severe to go to physical therapy.  She denies having weakness or numbness, denies bladder or bowel incontinence.    Pain Intervention History:  - s/p L3-4 WONG on 7/7/23 with 80-85% relief  - s/p L4-5 WONG 10/04/2023 with 50% relief.  - s/p bilateral L4/5 and  L5/S1 RFA on 09/17/2024 with 95% relief.    Past Spine Surgical History:      Past and current medications:  Antineuropathics:  Gabapentin 300 mg twice daily  NSAIDs:  Took ibuprofen for several months but developed side effects and discontinued  Physical therapy:  Physician directed home exercise program for about 3 months.  Completed PT and continues home exercise program.  Antidepressants:  Muscle relaxers:  Opioids:  Antiplatelets/Anticoagulants:    History:    Current Outpatient Medications:     alendronate (FOSAMAX) 70 MG tablet, Take 1 tablet (70 mg total) by mouth every 7 days., Disp: 4 tablet, Rfl: 3    DULoxetine (CYMBALTA) 60 MG capsule, TAKE 1 CAPSULE(60 MG) BY MOUTH EVERY DAY, Disp: 30 capsule, Rfl: 11    multivitamin (ONE DAILY MULTIVITAMIN) per tablet, Take 1 tablet by mouth once daily., Disp: , Rfl:     ALPRAZolam (XANAX) 0.25 MG tablet, Take 1 tablet (0.25 mg total) by mouth 2 (two) times daily as needed for Anxiety., Disp: 30 tablet, Rfl: 0    naproxen sodium (ANAPROX) 220 MG tablet, Take 220 mg by mouth 2 (two) times daily with meals., Disp: , Rfl:     pregabalin (LYRICA) 75 MG capsule, Take 1 capsule (75 mg total) by mouth 3 (three) times daily., Disp: 90 capsule, Rfl: 2    Past Medical History:   Diagnosis Date    Colon polyp 2019    Personal history of nicotine dependence        Past Surgical History:   Procedure Laterality Date    CATARACT EXTRACTION, BILATERAL      COLONOSCOPY N/A 11/07/2019    Procedure: COLONOSCOPY;  Surgeon: Pan Duran MD;  Location: [facility];  Service: Endoscopy;  Laterality: N/A;    EPIDURAL STEROID INJECTION INTO LUMBAR SPINE Left 07/07/2023    Procedure: Injection-steroid-epidural-lumbar L3/4;  Surgeon: Brian Traylor MD;  Location: [facility] OR;  Service: Pain Management;  Laterality: Left;    EPIDURAL STEROID INJECTION INTO LUMBAR SPINE N/A 10/04/2023    Procedure: Injection-steroid-epidural-lumbar;  Surgeon: Brian Traylor MD;  Location: [facility] OR;  Service:  Pain Management;  Laterality: N/A;  L4/5    EYE SURGERY  10/2013    catract surgery    INJECTION OF ANESTHETIC AGENT AROUND MEDIAL BRANCH NERVES INNERVATING LUMBAR FACET JOINT Bilateral 2024    Procedure: Block-nerve-medial branch-lumbar    L4/5 L5/S1;  Surgeon: Brian Traylor MD;  Location: [facility] OR;  Service: Pain Management;  Laterality: Bilateral;    INJECTION OF ANESTHETIC AGENT AROUND MEDIAL BRANCH NERVES INNERVATING LUMBAR FACET JOINT Bilateral 2024    Procedure: Block-nerve-medial branch-lumbar L4/5 and L5/S1;  Surgeon: Brian Traylor MD;  Location: [facility] OR;  Service: Pain Management;  Laterality: Bilateral;    RADIOFREQUENCY ABLATION OF LUMBAR MEDIAL BRANCH NERVE AT SINGLE LEVEL Bilateral 2024    Procedure: Radiofrequency Ablation, Nerve, Spinal, Lumbar, Medial Branch, L4/5, L5/S1;  Surgeon: Brian Traylor MD;  Location: [facility] OR;  Service: Pain Management;  Laterality: Bilateral;    TUBAL LIGATION         Family History   Problem Relation Name Age of Onset    Breast cancer Mother Trinidad Hector 70    Cancer Mother Trinidad Hector     Vision loss Mother Trinidad Hector         macular degeneration    Heart disease Father Brett Hector         malfunction valves    Diabetes Brother Darell Hector        Social History     Socioeconomic History    Marital status:    Tobacco Use    Smoking status: Former     Current packs/day: 0.00     Average packs/day: 1 pack/day for 44.0 years (44.0 ttl pk-yrs)     Types: Cigarettes     Start date: 1976     Quit date: 2020     Years since quittin.6    Smokeless tobacco: Never    Tobacco comments:     using 2 to 3 nicorette tablets to help quit   Substance and Sexual Activity    Alcohol use: Yes     Comment: Glass of wine once a month    Drug use: Never    Sexual activity: Yes     Partners: Male     Birth control/protection: Post-menopausal     Social Drivers of Health     Financial Resource Strain: Low Risk  (2024)    Overall  "Financial Resource Strain (CARDIA)     Difficulty of Paying Living Expenses: Not hard at all   Food Insecurity: Unknown (7/27/2024)    Hunger Vital Sign     Worried About Running Out of Food in the Last Year: Never true   Transportation Needs: No Transportation Needs (9/26/2023)    PRAPARE - Transportation     Lack of Transportation (Medical): No     Lack of Transportation (Non-Medical): No   Physical Activity: Insufficiently Active (7/27/2024)    Exercise Vital Sign     Days of Exercise per Week: 2 days     Minutes of Exercise per Session: 30 min   Stress: Stress Concern Present (7/27/2024)    [employer] - Occupational Stress Questionnaire     Feeling of Stress : To some extent   Housing Stability: Low Risk  (9/26/2023)    Housing Stability Vital Sign     Unable to Pay for Housing in the Last Year: No     Number of Places Lived in the Last Year: 1     Unstable Housing in the Last Year: No       Review of patient's allergies indicates:  No Known Allergies    Review of Systems:  The patient reports stomach pain, back pain, anxiety and loss of balance.  Balance of review of systems is negative.    Physical Exam:  Vitals:    02/27/25 0836   BP: 115/62   Pulse: (!) 59   Weight: 68.5 kg (150 lb 14.5 oz)   Height: 5' 2" (1.575 m)   PainSc:   7   PainLoc: Back       Body mass index is 27.6 kg/m².    Gen: NAD  Psych: mood appropriate for given condition  HEENT: eyes anicteric   CV: RRR  HEENT: anicteric   Respiratory: non-labored, no signs of respiratory distress  Abd: non-distended  Skin: warm, dry and intact.  Gait: antalgic    Sensory:  Intact and symmetrical to light touch in L1-S1 dermatomes bilaterally.    Motor:     Right Left   L2/3 Iliacus Hip flexion  5  5   L3/4 Qudratus Femoris Knee Extension  5  5   L4/5 Tib Anterior Ankle Dorsiflexion   5  5   L5/S1 Extensor Hallicus Longus Great toe extension  5  5   S1/S2 Gastroc/Soleus Plantar Flexion  5  5      Right Left                "   Patellar DTR 2+ 2+   Achilles DTR 0+ 0+                      Labs:  Lab Results   Component Value Date    HGBA1C 5.1 07/31/2024       Lab Results   Component Value Date    WBC 4.32 07/31/2024    HGB 13.1 07/31/2024    HCT 40.0 07/31/2024    MCV 95 07/31/2024     07/31/2024       Imaging:  MRI lumbar spine 06/16/2023  NOMENCLATURE: Five lumbar type vertebral bodies.     CORD/CAUDA EQUINA: Conus has normal size and signal and ends at a normal level of L1-L2.     ALIGNMENT: Trace retrolisthesis of L1 on L2.  4 mm retrolisthesis of L2 on L3.  Trace retrolisthesis of L3 on L4.  Trace grade 1 anterolisthesis of L4 on L5.     BONES: Vertebral body heights are maintained.  Type 1 endplate changes on the left at L1-2, L2-3 and to lesser extent at L3-4 and L4-5.  Several tiny Schmorl's nodes.  No aggressive bone marrow signal.     PARASPINAL AREA: Tiny possible synovial cyst in the left dorsal paraspinal soft tissues near the tip of the left L5-S1 facet (series 7, image 14).     LUMBAR DISC LEVELS:     T12-L1: No disc herniation or significant posterior osteophytic ridging.  No significant spinal canal or foraminal stenosis.  L1-L2: Trace retrolisthesis.  Mild disc bulge.  Disc height loss.  Mild bilateral facet hypertrophy.  Minimal narrowing of the bilateral lateral recesses.  No significant spinal canal stenosis.  Mild left foraminal stenosis.  No significant spinal canal or foraminal stenosis.  L2-L3: Retrolisthesis.  Uncovering the disc and mild disc bulge.  Mild bilateral facet hypertrophy.  Ligamentum flavum thickening.  Moderate left and mild right narrowing of the lateral recesses.  No significant spinal canal stenosis.  Moderate right and mild left foraminal stenosis.  L3-L4: Trace retrolisthesis.  Mild disc bulge.  Mild-moderate right and mild left facet hypertrophy.  Ligamentum flavum thickening.  Mild narrowing of the bilateral lateral recesses.  No significant spinal canal stenosis.  Mild bilateral  foraminal stenosis.  L4-L5: Trace anterolisthesis.  Mild disc bulge.  Moderate right and mild-moderate left facet hypertrophy.  Ligamentum flavum thickening.  Mild narrowing of the bilateral lateral recesses.  No significant spinal canal stenosis.  Mild bilateral foraminal stenosis.  L5-S1: Minimal disc bulge.  Mild-moderate left and mild right facet hypertrophy.  No significant spinal canal stenosis. Mild right greater than left foraminal stenosis.    Assessment:   Problem List Items Addressed This Visit    None  Visit Diagnoses         Lumbar radiculopathy    -  Primary      Dorsalgia                  Karla Bonilla is a 69 y.o. female patient who has a past medical history of Colon polyp and Personal history of nicotine dependence. She presents with low back       2/27/25 - Karla Bonilla returns to the office for follow up.  Today she reports worsening lower back pain that can radiate down into the left buttock into the left thigh.  Pain is worse with standing and activities.  She rates her pain as 7/10.  No numbness or weakness.    - on exam she has some lower back pain with left hip flexion but otherwise full strength in his lower extremities.  Intact sensation to light touch bilateral L2-S1  - I independently reviewed her lumbar MRI and at L2-3 she has a diffuse left-sided disc bulge with moderate left lateral recess narrowing.  She has multilevel bilateral facet arthropathy  - over the past 6 months she has participate in formal physical therapy and maintains PT directed home exercise program as well as use NSAIDs for inflammatory pain and Lyrica and Cymbalta for the neuropathic component of her pain  - she describes lumbar radicular pain  - her pain is limiting her mobility and interfering with the quality of her life  - we will schedule for an L3-4 WONG. The risks and benefits of this intervention, and alternative therapies were discussed with the patient.  The discussion of risks  included infection, bleeding, need for additional procedures or surgery, nerve damage.  Questions regarding the procedure, risks, expected outcome, and possible side effects were solicited and answered to the patient's satisfaction.  Karla Bonilla wishes to proceed with the injection or procedure.  Written consent was obtained.  - follow up 2-3 weeks post injection    :  Reviewed    This note was completed with dictation software and grammatical errors may exist.

## 2025-02-27 NOTE — TELEPHONE ENCOUNTER
Physician - Dr Traylor    Type of Procedure/Injection - Lumbar Epidural  L3/4           Laterality - NA      Priority - Normal      Anxiolysis- RNIV      Need to hold medication - No      Clearance needed - No      Follow up - 3 week

## 2025-02-27 NOTE — H&P (VIEW-ONLY)
[facility] Follow Up Evaluation      Referred by: No ref. provider found    PCP: Dr. Adriane Granados    CC:   Chief Complaint   Patient presents with    Low-back Pain          2/27/2025     8:35 AM 10/18/2024     2:18 PM 4/4/2024     9:22 AM   Last 3 PDI Scores   Pain Disability Index (PDI) 32 4 14       Interval HPI 2/27/2025: Karla Bonilla returns to the office for follow up.  Today she reports worsening lower back pain that can radiate down into the left buttock into the left thigh.  Pain is worse with standing and activities.  She rates her pain as 7/10.  No numbness or weakness.      HPI:   Karla Bonilla is a 69 y.o. female patient who has a past medical history of Colon polyp and Personal history of nicotine dependence. She presents with low back and left leg pain.  She reports having a history of right-sided pain but this is currently not bothering her.  She describes pain in the left low back that radiates into the left buttock and posterior thigh down to her knee.  This has been present for the past 3 and half months.  She attributes this to an ankle fracture last year, being sedentary and then starting to walk again.  She describes the pain as throbbing, deep, sharp and shooting.  It is worse with sitting, standing, bending, touching, walking, nighttime, morning, extension, flexing, lifting, getting out of a bed or a chair.  The only relief she has found has been with gabapentin 300 mg nightly.  She saw her primary care physician yesterday and was instructed to increase to twice a day.  She has been doing physician directed home exercises for the past several months without any relief and her pain is currently too severe to go to physical therapy.  She denies having weakness or numbness, denies bladder or bowel incontinence.    Pain Intervention History:  - s/p L3-4 WONG on 7/7/23 with 80-85% relief  - s/p L4-5 WONG 10/04/2023 with 50% relief.  - s/p bilateral L4/5 and  L5/S1 RFA on 09/17/2024 with 95% relief.    Past Spine Surgical History:      Past and current medications:  Antineuropathics:  Gabapentin 300 mg twice daily  NSAIDs:  Took ibuprofen for several months but developed side effects and discontinued  Physical therapy:  Physician directed home exercise program for about 3 months.  Completed PT and continues home exercise program.  Antidepressants:  Muscle relaxers:  Opioids:  Antiplatelets/Anticoagulants:    History:    Current Outpatient Medications:     alendronate (FOSAMAX) 70 MG tablet, Take 1 tablet (70 mg total) by mouth every 7 days., Disp: 4 tablet, Rfl: 3    DULoxetine (CYMBALTA) 60 MG capsule, TAKE 1 CAPSULE(60 MG) BY MOUTH EVERY DAY, Disp: 30 capsule, Rfl: 11    multivitamin (ONE DAILY MULTIVITAMIN) per tablet, Take 1 tablet by mouth once daily., Disp: , Rfl:     ALPRAZolam (XANAX) 0.25 MG tablet, Take 1 tablet (0.25 mg total) by mouth 2 (two) times daily as needed for Anxiety., Disp: 30 tablet, Rfl: 0    naproxen sodium (ANAPROX) 220 MG tablet, Take 220 mg by mouth 2 (two) times daily with meals., Disp: , Rfl:     pregabalin (LYRICA) 75 MG capsule, Take 1 capsule (75 mg total) by mouth 3 (three) times daily., Disp: 90 capsule, Rfl: 2    Past Medical History:   Diagnosis Date    Colon polyp 2019    Personal history of nicotine dependence        Past Surgical History:   Procedure Laterality Date    CATARACT EXTRACTION, BILATERAL      COLONOSCOPY N/A 11/07/2019    Procedure: COLONOSCOPY;  Surgeon: Pan Duran MD;  Location: [facility];  Service: Endoscopy;  Laterality: N/A;    EPIDURAL STEROID INJECTION INTO LUMBAR SPINE Left 07/07/2023    Procedure: Injection-steroid-epidural-lumbar L3/4;  Surgeon: Brian Traylor MD;  Location: [facility] OR;  Service: Pain Management;  Laterality: Left;    EPIDURAL STEROID INJECTION INTO LUMBAR SPINE N/A 10/04/2023    Procedure: Injection-steroid-epidural-lumbar;  Surgeon: Brian Traylor MD;  Location: [facility] OR;  Service:  Pain Management;  Laterality: N/A;  L4/5    EYE SURGERY  10/2013    catract surgery    INJECTION OF ANESTHETIC AGENT AROUND MEDIAL BRANCH NERVES INNERVATING LUMBAR FACET JOINT Bilateral 2024    Procedure: Block-nerve-medial branch-lumbar    L4/5 L5/S1;  Surgeon: Brian Traylor MD;  Location: [facility] OR;  Service: Pain Management;  Laterality: Bilateral;    INJECTION OF ANESTHETIC AGENT AROUND MEDIAL BRANCH NERVES INNERVATING LUMBAR FACET JOINT Bilateral 2024    Procedure: Block-nerve-medial branch-lumbar L4/5 and L5/S1;  Surgeon: Brian Traylor MD;  Location: [facility] OR;  Service: Pain Management;  Laterality: Bilateral;    RADIOFREQUENCY ABLATION OF LUMBAR MEDIAL BRANCH NERVE AT SINGLE LEVEL Bilateral 2024    Procedure: Radiofrequency Ablation, Nerve, Spinal, Lumbar, Medial Branch, L4/5, L5/S1;  Surgeon: Brian Traylor MD;  Location: [facility] OR;  Service: Pain Management;  Laterality: Bilateral;    TUBAL LIGATION         Family History   Problem Relation Name Age of Onset    Breast cancer Mother Trinidad Hector 70    Cancer Mother Trinidad Hector     Vision loss Mother Trinidad Hector         macular degeneration    Heart disease Father Brett Hector         malfunction valves    Diabetes Brother Darell Hector        Social History     Socioeconomic History    Marital status:    Tobacco Use    Smoking status: Former     Current packs/day: 0.00     Average packs/day: 1 pack/day for 44.0 years (44.0 ttl pk-yrs)     Types: Cigarettes     Start date: 1976     Quit date: 2020     Years since quittin.6    Smokeless tobacco: Never    Tobacco comments:     using 2 to 3 nicorette tablets to help quit   Substance and Sexual Activity    Alcohol use: Yes     Comment: Glass of wine once a month    Drug use: Never    Sexual activity: Yes     Partners: Male     Birth control/protection: Post-menopausal     Social Drivers of Health     Financial Resource Strain: Low Risk  (2024)    Overall  "Financial Resource Strain (CARDIA)     Difficulty of Paying Living Expenses: Not hard at all   Food Insecurity: Unknown (7/27/2024)    Hunger Vital Sign     Worried About Running Out of Food in the Last Year: Never true   Transportation Needs: No Transportation Needs (9/26/2023)    PRAPARE - Transportation     Lack of Transportation (Medical): No     Lack of Transportation (Non-Medical): No   Physical Activity: Insufficiently Active (7/27/2024)    Exercise Vital Sign     Days of Exercise per Week: 2 days     Minutes of Exercise per Session: 30 min   Stress: Stress Concern Present (7/27/2024)    [employer] - Occupational Stress Questionnaire     Feeling of Stress : To some extent   Housing Stability: Low Risk  (9/26/2023)    Housing Stability Vital Sign     Unable to Pay for Housing in the Last Year: No     Number of Places Lived in the Last Year: 1     Unstable Housing in the Last Year: No       Review of patient's allergies indicates:  No Known Allergies    Review of Systems:  The patient reports stomach pain, back pain, anxiety and loss of balance.  Balance of review of systems is negative.    Physical Exam:  Vitals:    02/27/25 0836   BP: 115/62   Pulse: (!) 59   Weight: 68.5 kg (150 lb 14.5 oz)   Height: 5' 2" (1.575 m)   PainSc:   7   PainLoc: Back       Body mass index is 27.6 kg/m².    Gen: NAD  Psych: mood appropriate for given condition  HEENT: eyes anicteric   CV: RRR  HEENT: anicteric   Respiratory: non-labored, no signs of respiratory distress  Abd: non-distended  Skin: warm, dry and intact.  Gait: antalgic    Sensory:  Intact and symmetrical to light touch in L1-S1 dermatomes bilaterally.    Motor:     Right Left   L2/3 Iliacus Hip flexion  5  5   L3/4 Qudratus Femoris Knee Extension  5  5   L4/5 Tib Anterior Ankle Dorsiflexion   5  5   L5/S1 Extensor Hallicus Longus Great toe extension  5  5   S1/S2 Gastroc/Soleus Plantar Flexion  5  5      Right Left                "   Patellar DTR 2+ 2+   Achilles DTR 0+ 0+                      Labs:  Lab Results   Component Value Date    HGBA1C 5.1 07/31/2024       Lab Results   Component Value Date    WBC 4.32 07/31/2024    HGB 13.1 07/31/2024    HCT 40.0 07/31/2024    MCV 95 07/31/2024     07/31/2024       Imaging:  MRI lumbar spine 06/16/2023  NOMENCLATURE: Five lumbar type vertebral bodies.     CORD/CAUDA EQUINA: Conus has normal size and signal and ends at a normal level of L1-L2.     ALIGNMENT: Trace retrolisthesis of L1 on L2.  4 mm retrolisthesis of L2 on L3.  Trace retrolisthesis of L3 on L4.  Trace grade 1 anterolisthesis of L4 on L5.     BONES: Vertebral body heights are maintained.  Type 1 endplate changes on the left at L1-2, L2-3 and to lesser extent at L3-4 and L4-5.  Several tiny Schmorl's nodes.  No aggressive bone marrow signal.     PARASPINAL AREA: Tiny possible synovial cyst in the left dorsal paraspinal soft tissues near the tip of the left L5-S1 facet (series 7, image 14).     LUMBAR DISC LEVELS:     T12-L1: No disc herniation or significant posterior osteophytic ridging.  No significant spinal canal or foraminal stenosis.  L1-L2: Trace retrolisthesis.  Mild disc bulge.  Disc height loss.  Mild bilateral facet hypertrophy.  Minimal narrowing of the bilateral lateral recesses.  No significant spinal canal stenosis.  Mild left foraminal stenosis.  No significant spinal canal or foraminal stenosis.  L2-L3: Retrolisthesis.  Uncovering the disc and mild disc bulge.  Mild bilateral facet hypertrophy.  Ligamentum flavum thickening.  Moderate left and mild right narrowing of the lateral recesses.  No significant spinal canal stenosis.  Moderate right and mild left foraminal stenosis.  L3-L4: Trace retrolisthesis.  Mild disc bulge.  Mild-moderate right and mild left facet hypertrophy.  Ligamentum flavum thickening.  Mild narrowing of the bilateral lateral recesses.  No significant spinal canal stenosis.  Mild bilateral  foraminal stenosis.  L4-L5: Trace anterolisthesis.  Mild disc bulge.  Moderate right and mild-moderate left facet hypertrophy.  Ligamentum flavum thickening.  Mild narrowing of the bilateral lateral recesses.  No significant spinal canal stenosis.  Mild bilateral foraminal stenosis.  L5-S1: Minimal disc bulge.  Mild-moderate left and mild right facet hypertrophy.  No significant spinal canal stenosis. Mild right greater than left foraminal stenosis.    Assessment:   Problem List Items Addressed This Visit    None  Visit Diagnoses         Lumbar radiculopathy    -  Primary      Dorsalgia                  Karla Bonilla is a 69 y.o. female patient who has a past medical history of Colon polyp and Personal history of nicotine dependence. She presents with low back       2/27/25 - Karla Bonilla returns to the office for follow up.  Today she reports worsening lower back pain that can radiate down into the left buttock into the left thigh.  Pain is worse with standing and activities.  She rates her pain as 7/10.  No numbness or weakness.    - on exam she has some lower back pain with left hip flexion but otherwise full strength in his lower extremities.  Intact sensation to light touch bilateral L2-S1  - I independently reviewed her lumbar MRI and at L2-3 she has a diffuse left-sided disc bulge with moderate left lateral recess narrowing.  She has multilevel bilateral facet arthropathy  - over the past 6 months she has participate in formal physical therapy and maintains PT directed home exercise program as well as use NSAIDs for inflammatory pain and Lyrica and Cymbalta for the neuropathic component of her pain  - she describes lumbar radicular pain  - her pain is limiting her mobility and interfering with the quality of her life  - we will schedule for an L3-4 WONG. The risks and benefits of this intervention, and alternative therapies were discussed with the patient.  The discussion of risks  included infection, bleeding, need for additional procedures or surgery, nerve damage.  Questions regarding the procedure, risks, expected outcome, and possible side effects were solicited and answered to the patient's satisfaction.  Karla Bonilla wishes to proceed with the injection or procedure.  Written consent was obtained.  - follow up 2-3 weeks post injection    :  Reviewed    This note was completed with dictation software and grammatical errors may exist.

## 2025-03-10 ENCOUNTER — HOSPITAL ENCOUNTER (OUTPATIENT)
Facility: HOSPITAL | Age: 70
Discharge: HOME | End: 2025-03-10
Attending: ANESTHESIOLOGY | Admitting: ANESTHESIOLOGY
Payer: MEDICARE

## 2025-03-10 ENCOUNTER — HOSPITAL ENCOUNTER (OUTPATIENT)
Dept: RADIOLOGY | Facility: HOSPITAL | Age: 70
Discharge: HOME | End: 2025-03-10
Attending: ANESTHESIOLOGY | Admitting: ANESTHESIOLOGY
Payer: MEDICARE

## 2025-03-10 DIAGNOSIS — M54.50 LOWER BACK PAIN: ICD-10-CM

## 2025-03-10 DIAGNOSIS — M54.16 LUMBAR RADICULOPATHY: Primary | ICD-10-CM

## 2025-03-10 PROCEDURE — 25000003 PHARM REV CODE 250: Mod: PO | Performed by: ANESTHESIOLOGY

## 2025-03-10 PROCEDURE — 62323 NJX INTERLAMINAR LMBR/SAC: CPT | Mod: PO | Performed by: ANESTHESIOLOGY

## 2025-03-10 PROCEDURE — 25500020 PHARM REV CODE 255: Mod: PO | Performed by: ANESTHESIOLOGY

## 2025-03-10 PROCEDURE — A4216 STERILE WATER/SALINE, 10 ML: HCPCS | Mod: PO | Performed by: ANESTHESIOLOGY

## 2025-03-10 PROCEDURE — 63600175 PHARM REV CODE 636 W HCPCS: Mod: PO | Performed by: ANESTHESIOLOGY

## 2025-03-10 PROCEDURE — 62323 NJX INTERLAMINAR LMBR/SAC: CPT | Mod: ,,, | Performed by: ANESTHESIOLOGY

## 2025-03-10 RX ORDER — SODIUM CHLORIDE, SODIUM LACTATE, POTASSIUM CHLORIDE, CALCIUM CHLORIDE 600; 310; 30; 20 MG/100ML; MG/100ML; MG/100ML; MG/100ML
INJECTION, SOLUTION INTRAVENOUS CONTINUOUS
Status: DISCONTINUED | OUTPATIENT
Start: 2025-03-10 | End: 2025-03-10 | Stop reason: HOSPADM

## 2025-03-10 RX ORDER — LIDOCAINE HYDROCHLORIDE 10 MG/ML
INJECTION, SOLUTION EPIDURAL; INFILTRATION; INTRACAUDAL; PERINEURAL
Status: DISCONTINUED | OUTPATIENT
Start: 2025-03-10 | End: 2025-03-10 | Stop reason: HOSPADM

## 2025-03-10 RX ORDER — SODIUM CHLORIDE 9 MG/ML
INJECTION, SOLUTION INTRAMUSCULAR; INTRAVENOUS; SUBCUTANEOUS
Status: DISCONTINUED | OUTPATIENT
Start: 2025-03-10 | End: 2025-03-10 | Stop reason: HOSPADM

## 2025-03-10 RX ORDER — SODIUM CHLORIDE 9 MG/ML
INJECTION, SOLUTION INTRAVENOUS CONTINUOUS
Status: DISCONTINUED | OUTPATIENT
Start: 2025-03-10 | End: 2025-03-10 | Stop reason: HOSPADM

## 2025-03-10 RX ORDER — MIDAZOLAM HYDROCHLORIDE 1 MG/ML
INJECTION INTRAMUSCULAR; INTRAVENOUS
Status: DISCONTINUED | OUTPATIENT
Start: 2025-03-10 | End: 2025-03-10 | Stop reason: HOSPADM

## 2025-03-10 RX ORDER — METHYLPREDNISOLONE ACETATE 80 MG/ML
INJECTION, SUSPENSION INTRA-ARTICULAR; INTRALESIONAL; INTRAMUSCULAR; SOFT TISSUE
Status: DISCONTINUED | OUTPATIENT
Start: 2025-03-10 | End: 2025-03-10 | Stop reason: HOSPADM

## 2025-03-10 RX ADMIN — SODIUM CHLORIDE: 9 INJECTION, SOLUTION INTRAVENOUS at 10:03

## 2025-03-10 NOTE — OP NOTE
"Procedure Note    Procedure Date: 3/10/2025    Procedure Performed:  L3/4 lumbar interlaminar epidural steroid injection under fluoroscopy.    Indications:  Karla Bonilla presents with lumbar radiculitis/radiculopathy secondary to disc herniation, osteophyte/osteophyte complexes, and/or severe degenerative disc disease producing foraminal or central spinal stenosis.  The pain has been present for at least 4 weeks and the patient has failed to respond to noninvasive conservative care.  Pain rated by NRS at baseline prior to intervention is 6/10.  Their radiculitis/radiculopathy and/or neurogenic claudication is severe enough to greatly impact their quality of life or function.     Pre-op diagnosis: Lumbar Radiculopathy    Post-op diagnosis: same    Physician: Brian Traylor MD    IV anxiolysis medications: versed 2mg    Medications injected: depomedrol 80mg, 1% Lidocaine 1ml, 2 mL sterile, preservative-free normal saline.    Local anesthetic used: 1% Lidocaine, 1 ml    Estimated Blood Loss: none    Complications:  none    Technique:  The patient was interviewed in the holding area and Risks/Benefits were discussed, including, but not limited to, the possibility of new or different pain, bleeding or infection.   All questions were answered.  The patient understood and accepted risks.  Consent was verfied.  A time-out was taken to identify patient and procedure prior to starting the procedure. The patient was placed in the prone position on the fluoroscopy table. The area of the lumbar spine was prepped with Chloraprep x2 and draped in a sterile manner. The L3/4 interspace was identified and marked under AP fluoroscopy. The skin and subcutaneous tissues overlying the targeted interspace were anesthetized with 3-5 mL of 1% lidocaine using a 25G, 1.5" needle.  A 20G, 3.5" Tuohy epidural needle was directed toward the interspace under fluoroscopic guidance until the ligamentum flavum was engaged. From this point, " a loss of resistance technique with a glass syringe and saline was used to identify entrance of the needle into the epidural space. Once loss of resistance was observed 1 mL of contrast solution was injected. An appropriate epidurogram was noted.  A 4 mL mixture consisting of saline, 1 mL 1% Lidocaine and 80 mg of depomedrol was injected slowly and without resistance.  The needle was flushed with normal saline and removed. The contrast was seen to be displaced after injection. Patient was awake/responsive during all injections.  The patient tolerated the procedure well and was transferred to the [facility] in stable condition.  The patient was monitored after the procedure and was given post-procedure and discharge instructions to follow at home. The patient was discharged in a stable condition.

## 2025-03-10 NOTE — INTERVAL H&P NOTE
The patient has been examined and the H&P has been reviewed:    I concur with the findings and no changes have occurred since H&P was written.  ASA 2, MP II        There are no hospital problems to display for this patient.

## 2025-03-10 NOTE — DISCHARGE SUMMARY
[facility]  Discharge Note  Short Stay    Admit Date: 3/10/2025    Discharge Date: 3/10/2025    Attending Physician: Brian Traylor     Discharge Provider: Brian Traylor    Diagnoses:  There are no hospital problems to display for this patient.      Discharged Condition: Good    Final Diagnoses: Lumbar radiculopathy [M54.16]    Disposition: Home or Self Care    Hospital Course: No complications, uneventful    Outcome of Hospitalization, Treatment, Procedure, or Surgery:  Patient was admitted for outpatient interventional pain management procedure. The patient tolerated the procedure well with no complications.    Follow up/Patient Instructions:  Follow up as scheduled in Pain Management office in 2-3 weeks.  Patient has received instructions and follow up date and time.    Medications:  Continue previous medications    Discharge Procedure Orders   Notify your health care provider if you experience any of the following:  temperature >100.4     Notify your health care provider if you experience any of the following:  persistent nausea and vomiting or diarrhea     Notify your health care provider if you experience any of the following:  severe uncontrolled pain     Notify your health care provider if you experience any of the following:  redness, tenderness, or signs of infection (pain, swelling, redness, odor or green/yellow discharge around incision site)     Notify your health care provider if you experience any of the following:  difficulty breathing or increased cough     Notify your health care provider if you experience any of the following:  severe persistent headache     Notify your health care provider if you experience any of the following:  worsening rash     Notify your health care provider if you experience any of the following:  persistent dizziness, light-headedness, or visual disturbances     Notify your health care provider if you experience any of the following:  increased confusion or  weakness     Activity as tolerated

## 2025-03-11 VITALS
SYSTOLIC BLOOD PRESSURE: 118 MMHG | WEIGHT: 150 LBS | HEART RATE: 58 BPM | TEMPERATURE: 98 F | BODY MASS INDEX: 27.6 KG/M2 | DIASTOLIC BLOOD PRESSURE: 58 MMHG | RESPIRATION RATE: 16 BRPM | HEIGHT: 62 IN | OXYGEN SATURATION: 98 %

## 2025-04-01 ENCOUNTER — OFFICE VISIT (OUTPATIENT)
Dept: PAIN MEDICINE | Facility: CLINIC | Age: 70
End: 2025-04-01
Payer: MEDICARE

## 2025-04-01 VITALS
SYSTOLIC BLOOD PRESSURE: 120 MMHG | WEIGHT: 147.5 LBS | DIASTOLIC BLOOD PRESSURE: 57 MMHG | HEART RATE: 64 BPM | BODY MASS INDEX: 26.98 KG/M2

## 2025-04-01 DIAGNOSIS — M43.16 SPONDYLOLISTHESIS OF LUMBAR REGION: ICD-10-CM

## 2025-04-01 DIAGNOSIS — M79.18 MYOFASCIAL PAIN: ICD-10-CM

## 2025-04-01 DIAGNOSIS — M47.816 LUMBAR SPONDYLOSIS: ICD-10-CM

## 2025-04-01 DIAGNOSIS — M54.9 DORSALGIA: ICD-10-CM

## 2025-04-01 DIAGNOSIS — M54.16 LUMBAR RADICULOPATHY: Primary | ICD-10-CM

## 2025-04-01 PROCEDURE — 3074F SYST BP LT 130 MM HG: CPT | Mod: CPTII,S$GLB,,

## 2025-04-01 PROCEDURE — 1126F AMNT PAIN NOTED NONE PRSNT: CPT | Mod: CPTII,S$GLB,,

## 2025-04-01 PROCEDURE — 1159F MED LIST DOCD IN RCRD: CPT | Mod: CPTII,S$GLB,,

## 2025-04-01 PROCEDURE — 3008F BODY MASS INDEX DOCD: CPT | Mod: CPTII,S$GLB,,

## 2025-04-01 PROCEDURE — 3288F FALL RISK ASSESSMENT DOCD: CPT | Mod: CPTII,S$GLB,,

## 2025-04-01 PROCEDURE — 1101F PT FALLS ASSESS-DOCD LE1/YR: CPT | Mod: CPTII,S$GLB,,

## 2025-04-01 PROCEDURE — 99999 PR PBB SHADOW E&M-EST. PATIENT-LVL III: CPT | Mod: PBBFAC,,,

## 2025-04-01 PROCEDURE — 99213 OFFICE O/P EST LOW 20 MIN: CPT | Mod: S$GLB,,,

## 2025-04-01 PROCEDURE — 3078F DIAST BP <80 MM HG: CPT | Mod: CPTII,S$GLB,,

## 2025-04-01 NOTE — PROGRESS NOTES
[facility] Follow Up Evaluation      Referred by: No ref. provider found    PCP: Dr. Adriane Granados    CC:   Chief Complaint   Patient presents with    Back Pain          4/1/2025     3:26 PM 2/27/2025     8:35 AM 10/18/2024     2:18 PM   Last 3 PDI Scores   Pain Disability Index (PDI) 3 32 4     Interval HPI 4/1/2025: Karla Bonilla returns to the office for follow up.  She is s/p L3/4 WONG on 03/10/2025 with 95% relief.  Overall feeling much better.  Very minimal remaining lower back pain.  No new numbness, weakness or any new changes to her bowel bladder function.      HPI:   Karla Bonilla is a 69 y.o. female patient who has a past medical history of Colon polyp and Personal history of nicotine dependence. She presents with low back and left leg pain.  She reports having a history of right-sided pain but this is currently not bothering her.  She describes pain in the left low back that radiates into the left buttock and posterior thigh down to her knee.  This has been present for the past 3 and half months.  She attributes this to an ankle fracture last year, being sedentary and then starting to walk again.  She describes the pain as throbbing, deep, sharp and shooting.  It is worse with sitting, standing, bending, touching, walking, nighttime, morning, extension, flexing, lifting, getting out of a bed or a chair.  The only relief she has found has been with gabapentin 300 mg nightly.  She saw her primary care physician yesterday and was instructed to increase to twice a day.  She has been doing physician directed home exercises for the past several months without any relief and her pain is currently too severe to go to physical therapy.  She denies having weakness or numbness, denies bladder or bowel incontinence.    Pain Intervention History:  - s/p L3-4 WONG on 7/7/23 with 80-85% relief  - s/p L4-5 WONG 10/04/2023 with 50% relief.  - s/p bilateral L4/5 and L5/S1 RFA on  09/17/2024 with 95% relief.  - s/p L3/4 WONG on 03/10/2025 with 95% relief    Past Spine Surgical History:      Past and current medications:  Antineuropathics:  Gabapentin 300 mg twice daily  NSAIDs:  Took ibuprofen for several months but developed side effects and discontinued  Physical therapy:  Physician directed home exercise program for about 3 months.  Completed PT and continues home exercise program.  Antidepressants:  Muscle relaxers:  Opioids:  Antiplatelets/Anticoagulants:    History:    Current Outpatient Medications:     acetaminophen (TYLENOL ARTHRITIS ORAL), Take by mouth., Disp: , Rfl:     alendronate (FOSAMAX) 70 MG tablet, Take 1 tablet (70 mg total) by mouth every 7 days., Disp: 4 tablet, Rfl: 3    DULoxetine (CYMBALTA) 60 MG capsule, TAKE 1 CAPSULE(60 MG) BY MOUTH EVERY DAY, Disp: 30 capsule, Rfl: 11    multivitamin (ONE DAILY MULTIVITAMIN) per tablet, Take 1 tablet by mouth once daily., Disp: , Rfl:     naproxen sodium (ANAPROX) 220 MG tablet, Take 220 mg by mouth 2 (two) times daily with meals., Disp: , Rfl:     ALPRAZolam (XANAX) 0.25 MG tablet, Take 1 tablet (0.25 mg total) by mouth 2 (two) times daily as needed for Anxiety., Disp: 30 tablet, Rfl: 0    pregabalin (LYRICA) 75 MG capsule, Take 1 capsule (75 mg total) by mouth 3 (three) times daily., Disp: 90 capsule, Rfl: 2    Past Medical History:   Diagnosis Date    Colon polyp 2019    Personal history of nicotine dependence        Past Surgical History:   Procedure Laterality Date    CATARACT EXTRACTION, BILATERAL      COLONOSCOPY N/A 11/07/2019    Procedure: COLONOSCOPY;  Surgeon: Pan Duran MD;  Location: [facility];  Service: Endoscopy;  Laterality: N/A;    EPIDURAL STEROID INJECTION INTO LUMBAR SPINE Left 07/07/2023    Procedure: Injection-steroid-epidural-lumbar L3/4;  Surgeon: Brian Traylor MD;  Location: [facility] OR;  Service: Pain Management;  Laterality: Left;    EPIDURAL STEROID INJECTION INTO LUMBAR SPINE N/A 10/04/2023     Procedure: Injection-steroid-epidural-lumbar;  Surgeon: Brian Traylor MD;  Location: [facility] OR;  Service: Pain Management;  Laterality: N/A;  L4/5    EPIDURAL STEROID INJECTION INTO LUMBAR SPINE N/A 3/10/2025    Procedure: Injection-steroid-epidural-lumbar   L3/4;  Surgeon: Brian Traylor MD;  Location: [facility] OR;  Service: Pain Management;  Laterality: N/A;  normal    EYE SURGERY  10/2013    catract surgery    INJECTION OF ANESTHETIC AGENT AROUND MEDIAL BRANCH NERVES INNERVATING LUMBAR FACET JOINT Bilateral 2024    Procedure: Block-nerve-medial branch-lumbar    L4/5 L5/S1;  Surgeon: Brian Traylor MD;  Location: [facility] OR;  Service: Pain Management;  Laterality: Bilateral;    INJECTION OF ANESTHETIC AGENT AROUND MEDIAL BRANCH NERVES INNERVATING LUMBAR FACET JOINT Bilateral 2024    Procedure: Block-nerve-medial branch-lumbar L4/5 and L5/S1;  Surgeon: Brian Traylor MD;  Location: [facility] OR;  Service: Pain Management;  Laterality: Bilateral;    RADIOFREQUENCY ABLATION OF LUMBAR MEDIAL BRANCH NERVE AT SINGLE LEVEL Bilateral 2024    Procedure: Radiofrequency Ablation, Nerve, Spinal, Lumbar, Medial Branch, L4/5, L5/S1;  Surgeon: Brian Traylor MD;  Location: [facility] OR;  Service: Pain Management;  Laterality: Bilateral;    TUBAL LIGATION         Family History   Problem Relation Name Age of Onset    Breast cancer Mother Trinidad Hector 70    Cancer Mother Trinidad Hector     Vision loss Mother Trinidad Hector         macular degeneration    Heart disease Father Brett Hector         malfunction valves    Diabetes Brother Darell Hector        Social History     Socioeconomic History    Marital status:    Tobacco Use    Smoking status: Former     Current packs/day: 0.00     Average packs/day: 1 pack/day for 44.0 years (44.0 ttl pk-yrs)     Types: Cigarettes     Start date: 1976     Quit date: 2020     Years since quittin.6    Smokeless tobacco: Never    Tobacco comments:     using 2  to 3 nicorette tablets to help quit   Substance and Sexual Activity    Alcohol use: Yes     Comment: Glass of wine once a month    Drug use: Never    Sexual activity: Yes     Partners: Male     Birth control/protection: Post-menopausal     Social Drivers of Health     Financial Resource Strain: Low Risk  (7/27/2024)    Overall Financial Resource Strain (CARDIA)     Difficulty of Paying Living Expenses: Not hard at all   Food Insecurity: Unknown (7/27/2024)    Hunger Vital Sign     Worried About Running Out of Food in the Last Year: Never true   Transportation Needs: No Transportation Needs (9/26/2023)    PRAPARE - Transportation     Lack of Transportation (Medical): No     Lack of Transportation (Non-Medical): No   Physical Activity: Insufficiently Active (7/27/2024)    Exercise Vital Sign     Days of Exercise per Week: 2 days     Minutes of Exercise per Session: 30 min   Stress: Stress Concern Present (7/27/2024)    [employer] - Occupational Stress Questionnaire     Feeling of Stress : To some extent   Housing Stability: Low Risk  (9/26/2023)    Housing Stability Vital Sign     Unable to Pay for Housing in the Last Year: No     Number of Places Lived in the Last Year: 1     Unstable Housing in the Last Year: No       Review of patient's allergies indicates:  No Known Allergies    Review of Systems:  The patient reports stomach pain, back pain, anxiety and loss of balance.  Balance of review of systems is negative.    Physical Exam:  Vitals:    04/01/25 1527   BP: (!) 120/57   Pulse: 64   Weight: 66.9 kg (147 lb 7.8 oz)   PainSc: 0-No pain   PainLoc: Back       Body mass index is 26.98 kg/m².    Gen: NAD  Psych: mood appropriate for given condition  HEENT: eyes anicteric   CV: RRR  HEENT: anicteric   Respiratory: non-labored, no signs of respiratory distress  Abd: non-distended  Skin: warm, dry and intact.  Gait: antalgic    Sensory:  Intact and symmetrical to light touch in L1-S1  dermatomes bilaterally.    Motor:     Right Left   L2/3 Iliacus Hip flexion  5  5   L3/4 Qudratus Femoris Knee Extension  5  5   L4/5 Tib Anterior Ankle Dorsiflexion   5  5   L5/S1 Extensor Hallicus Longus Great toe extension  5  5   S1/S2 Gastroc/Soleus Plantar Flexion  5  5      Right Left                  Patellar DTR 2+ 2+   Achilles DTR 0+ 0+                      Labs:  Lab Results   Component Value Date    HGBA1C 5.1 07/31/2024       Lab Results   Component Value Date    WBC 4.32 07/31/2024    HGB 13.1 07/31/2024    HCT 40.0 07/31/2024    MCV 95 07/31/2024     07/31/2024       Imaging:  MRI lumbar spine 06/16/2023  NOMENCLATURE: Five lumbar type vertebral bodies.     CORD/CAUDA EQUINA: Conus has normal size and signal and ends at a normal level of L1-L2.     ALIGNMENT: Trace retrolisthesis of L1 on L2.  4 mm retrolisthesis of L2 on L3.  Trace retrolisthesis of L3 on L4.  Trace grade 1 anterolisthesis of L4 on L5.     BONES: Vertebral body heights are maintained.  Type 1 endplate changes on the left at L1-2, L2-3 and to lesser extent at L3-4 and L4-5.  Several tiny Schmorl's nodes.  No aggressive bone marrow signal.     PARASPINAL AREA: Tiny possible synovial cyst in the left dorsal paraspinal soft tissues near the tip of the left L5-S1 facet (series 7, image 14).     LUMBAR DISC LEVELS:     T12-L1: No disc herniation or significant posterior osteophytic ridging.  No significant spinal canal or foraminal stenosis.  L1-L2: Trace retrolisthesis.  Mild disc bulge.  Disc height loss.  Mild bilateral facet hypertrophy.  Minimal narrowing of the bilateral lateral recesses.  No significant spinal canal stenosis.  Mild left foraminal stenosis.  No significant spinal canal or foraminal stenosis.  L2-L3: Retrolisthesis.  Uncovering the disc and mild disc bulge.  Mild bilateral facet hypertrophy.  Ligamentum flavum thickening.  Moderate left and mild right narrowing of the lateral recesses.  No significant spinal  canal stenosis.  Moderate right and mild left foraminal stenosis.  L3-L4: Trace retrolisthesis.  Mild disc bulge.  Mild-moderate right and mild left facet hypertrophy.  Ligamentum flavum thickening.  Mild narrowing of the bilateral lateral recesses.  No significant spinal canal stenosis.  Mild bilateral foraminal stenosis.  L4-L5: Trace anterolisthesis.  Mild disc bulge.  Moderate right and mild-moderate left facet hypertrophy.  Ligamentum flavum thickening.  Mild narrowing of the bilateral lateral recesses.  No significant spinal canal stenosis.  Mild bilateral foraminal stenosis.  L5-S1: Minimal disc bulge.  Mild-moderate left and mild right facet hypertrophy.  No significant spinal canal stenosis. Mild right greater than left foraminal stenosis.    Assessment:   Problem List Items Addressed This Visit    None  Visit Diagnoses         Lumbar radiculopathy    -  Primary      Lumbar spondylosis          Dorsalgia          Spondylolisthesis of lumbar region          Myofascial pain                    Karla Bonilla is a 69 y.o. female patient who has a past medical history of Colon polyp and Personal history of nicotine dependence. She presents with low back     4/1/2025: Karla Bonilla returns to the office for follow up.  She is s/p L3/4 WONG on 03/10/2025 with 95% relief.  Overall feeling much better.  Very minimal remaining lower back pain.  No new numbness, weakness or any new changes to her bowel bladder function.      - She is s/p L3/4 WONG on 03/10/2025 with 95% relief.  - I independently reviewed her lumbar MRI and at L2-3 she has a diffuse left-sided disc bulge with moderate left lateral recess narrowing.  She has multilevel bilateral facet arthropathy  - she responded very well to the repeat lumbar epidural, no significant remaining pain.  - she reports occasional dizziness when taking her medications and is unsure if it is originating from the Cymbalta or the Lyrica.  - we discussed down  titrating the Lyrica 75 mg t.i.d. to b.i.d. and if managing with no significantly worsening pain, decrease to once daily.  If she finds no significant change to the dizziness, can consider adjusting Cymbalta to 30 mg once daily.  These prescriptions appear to be managed by primary care so I will defer to them.  - follow up as needed.      :  Reviewed    This note was completed with dictation software and grammatical errors may exist.

## 2025-04-04 RX ORDER — DULOXETIN HYDROCHLORIDE 60 MG/1
60 CAPSULE, DELAYED RELEASE ORAL DAILY
Qty: 30 CAPSULE | Refills: 11 | Status: SHIPPED | OUTPATIENT
Start: 2025-04-04

## 2025-04-29 DIAGNOSIS — M54.16 LUMBAR RADICULOPATHY: Primary | ICD-10-CM

## 2025-04-29 RX ORDER — PREGABALIN 75 MG/1
75 CAPSULE ORAL 3 TIMES DAILY
Qty: 90 CAPSULE | Refills: 2 | Status: SHIPPED | OUTPATIENT
Start: 2025-04-29 | End: 2025-07-28

## (undated) DEVICE — APPLICATOR CHLORAPREP CLR 10.5

## (undated) DEVICE — SOL SOD CHLORIDE 0.9% 10ML

## (undated) DEVICE — HANDLE SURG LIGHT NONRIGID

## (undated) DEVICE — TRAY NERVE BLOCK

## (undated) DEVICE — GLOVE 7.5 PROTEXIS PI MICRO

## (undated) DEVICE — NDL SPINAL 25GX3.5 SPINOCAN

## (undated) DEVICE — CANNULA VENOM 20G 10X100MM

## (undated) DEVICE — TOWEL OR DISP STRL BLUE 4/PK

## (undated) DEVICE — PAD ELECTROSURGICAL PAT PLATE